# Patient Record
Sex: MALE | Race: WHITE | NOT HISPANIC OR LATINO | Employment: OTHER | URBAN - METROPOLITAN AREA
[De-identification: names, ages, dates, MRNs, and addresses within clinical notes are randomized per-mention and may not be internally consistent; named-entity substitution may affect disease eponyms.]

---

## 2021-11-27 ENCOUNTER — APPOINTMENT (OUTPATIENT)
Dept: CT IMAGING | Facility: HOSPITAL | Age: 81
End: 2021-11-27

## 2021-11-27 ENCOUNTER — APPOINTMENT (OUTPATIENT)
Dept: CARDIOLOGY | Facility: HOSPITAL | Age: 81
End: 2021-11-27

## 2021-11-27 ENCOUNTER — HOSPITAL ENCOUNTER (INPATIENT)
Facility: HOSPITAL | Age: 81
LOS: 6 days | Discharge: HOME HEALTH | End: 2021-12-03
Attending: EMERGENCY MEDICINE | Admitting: HOSPITALIST

## 2021-11-27 ENCOUNTER — APPOINTMENT (OUTPATIENT)
Dept: GENERAL RADIOLOGY | Facility: HOSPITAL | Age: 81
End: 2021-11-27

## 2021-11-27 DIAGNOSIS — R09.02 HYPOXIA: ICD-10-CM

## 2021-11-27 DIAGNOSIS — J96.01 ACUTE RESPIRATORY FAILURE WITH HYPOXIA (HCC): Primary | ICD-10-CM

## 2021-11-27 DIAGNOSIS — G62.9 PERIPHERAL POLYNEUROPATHY: ICD-10-CM

## 2021-11-27 DIAGNOSIS — J18.9 PNEUMONIA OF RIGHT UPPER LOBE DUE TO INFECTIOUS ORGANISM: ICD-10-CM

## 2021-11-27 PROBLEM — E87.6 HYPOKALEMIA: Status: ACTIVE | Noted: 2021-11-27

## 2021-11-27 PROBLEM — N40.0 BPH (BENIGN PROSTATIC HYPERPLASIA): Status: ACTIVE | Noted: 2021-11-27

## 2021-11-27 PROBLEM — A41.9 SEPSIS, UNSPECIFIED ORGANISM: Status: ACTIVE | Noted: 2021-11-27

## 2021-11-27 PROBLEM — E78.5 HYPERLIPIDEMIA: Status: ACTIVE | Noted: 2021-11-27

## 2021-11-27 PROBLEM — I10 ESSENTIAL HYPERTENSION: Status: ACTIVE | Noted: 2021-11-27

## 2021-11-27 LAB
ALBUMIN SERPL-MCNC: 3.9 G/DL (ref 3.5–5.2)
ALBUMIN/GLOB SERPL: 1.4 G/DL
ALP SERPL-CCNC: 52 U/L (ref 39–117)
ALT SERPL W P-5'-P-CCNC: 24 U/L (ref 1–41)
ANION GAP SERPL CALCULATED.3IONS-SCNC: 10 MMOL/L (ref 5–15)
AST SERPL-CCNC: 23 U/L (ref 1–40)
BASOPHILS # BLD AUTO: 0.07 10*3/MM3 (ref 0–0.2)
BASOPHILS NFR BLD AUTO: 0.6 % (ref 0–1.5)
BILIRUB SERPL-MCNC: 0.7 MG/DL (ref 0–1.2)
BUN SERPL-MCNC: 9 MG/DL (ref 8–23)
BUN/CREAT SERPL: 14.1 (ref 7–25)
CALCIUM SPEC-SCNC: 8.9 MG/DL (ref 8.6–10.5)
CHLORIDE SERPL-SCNC: 94 MMOL/L (ref 98–107)
CO2 SERPL-SCNC: 31 MMOL/L (ref 22–29)
CREAT SERPL-MCNC: 0.64 MG/DL (ref 0.76–1.27)
D-LACTATE SERPL-SCNC: 1.4 MMOL/L (ref 0.5–2)
D-LACTATE SERPL-SCNC: 2.5 MMOL/L (ref 0.5–2)
DEPRECATED RDW RBC AUTO: 42.7 FL (ref 37–54)
EOSINOPHIL # BLD AUTO: 0.18 10*3/MM3 (ref 0–0.4)
EOSINOPHIL NFR BLD AUTO: 1.5 % (ref 0.3–6.2)
ERYTHROCYTE [DISTWIDTH] IN BLOOD BY AUTOMATED COUNT: 13.5 % (ref 12.3–15.4)
FLUAV SUBTYP SPEC NAA+PROBE: NOT DETECTED
FLUBV RNA ISLT QL NAA+PROBE: NOT DETECTED
GFR SERPL CREATININE-BSD FRML MDRD: 120 ML/MIN/1.73
GLOBULIN UR ELPH-MCNC: 2.7 GM/DL
GLUCOSE SERPL-MCNC: 117 MG/DL (ref 65–99)
HCT VFR BLD AUTO: 41.4 % (ref 37.5–51)
HGB BLD-MCNC: 13.1 G/DL (ref 13–17.7)
HOLD SPECIMEN: NORMAL
IMM GRANULOCYTES # BLD AUTO: 0.1 10*3/MM3 (ref 0–0.05)
IMM GRANULOCYTES NFR BLD AUTO: 0.8 % (ref 0–0.5)
L PNEUMO1 AG UR QL IA: NEGATIVE
LYMPHOCYTES # BLD AUTO: 0.64 10*3/MM3 (ref 0.7–3.1)
LYMPHOCYTES NFR BLD AUTO: 5.2 % (ref 19.6–45.3)
MCH RBC QN AUTO: 27.4 PG (ref 26.6–33)
MCHC RBC AUTO-ENTMCNC: 31.6 G/DL (ref 31.5–35.7)
MCV RBC AUTO: 86.6 FL (ref 79–97)
MONOCYTES # BLD AUTO: 1.47 10*3/MM3 (ref 0.1–0.9)
MONOCYTES NFR BLD AUTO: 12 % (ref 5–12)
MRSA DNA SPEC QL NAA+PROBE: NEGATIVE
NEUTROPHILS NFR BLD AUTO: 79.9 % (ref 42.7–76)
NEUTROPHILS NFR BLD AUTO: 9.76 10*3/MM3 (ref 1.7–7)
NRBC BLD AUTO-RTO: 0 /100 WBC (ref 0–0.2)
NT-PROBNP SERPL-MCNC: 402.5 PG/ML (ref 0–1800)
PLATELET # BLD AUTO: 105 10*3/MM3 (ref 140–450)
PMV BLD AUTO: 10.4 FL (ref 6–12)
POTASSIUM SERPL-SCNC: 3.3 MMOL/L (ref 3.5–5.2)
PROCALCITONIN SERPL-MCNC: 0.07 NG/ML (ref 0–0.25)
PROT SERPL-MCNC: 6.6 G/DL (ref 6–8.5)
RBC # BLD AUTO: 4.78 10*6/MM3 (ref 4.14–5.8)
S PNEUM AG SPEC QL LA: NEGATIVE
SARS-COV-2 RNA PNL SPEC NAA+PROBE: NOT DETECTED
SODIUM SERPL-SCNC: 135 MMOL/L (ref 136–145)
TROPONIN T SERPL-MCNC: <0.01 NG/ML (ref 0–0.03)
WBC NRBC COR # BLD: 12.22 10*3/MM3 (ref 3.4–10.8)
WHOLE BLOOD HOLD SPECIMEN: NORMAL
WHOLE BLOOD HOLD SPECIMEN: NORMAL

## 2021-11-27 PROCEDURE — 71275 CT ANGIOGRAPHY CHEST: CPT

## 2021-11-27 PROCEDURE — 99223 1ST HOSP IP/OBS HIGH 75: CPT | Performed by: HOSPITALIST

## 2021-11-27 PROCEDURE — 87899 AGENT NOS ASSAY W/OPTIC: CPT | Performed by: HOSPITALIST

## 2021-11-27 PROCEDURE — 93005 ELECTROCARDIOGRAM TRACING: CPT

## 2021-11-27 PROCEDURE — 83605 ASSAY OF LACTIC ACID: CPT | Performed by: EMERGENCY MEDICINE

## 2021-11-27 PROCEDURE — 93005 ELECTROCARDIOGRAM TRACING: CPT | Performed by: EMERGENCY MEDICINE

## 2021-11-27 PROCEDURE — 84484 ASSAY OF TROPONIN QUANT: CPT | Performed by: EMERGENCY MEDICINE

## 2021-11-27 PROCEDURE — 93005 ELECTROCARDIOGRAM TRACING: CPT | Performed by: HOSPITALIST

## 2021-11-27 PROCEDURE — 87636 SARSCOV2 & INF A&B AMP PRB: CPT | Performed by: EMERGENCY MEDICINE

## 2021-11-27 PROCEDURE — 80053 COMPREHEN METABOLIC PANEL: CPT | Performed by: EMERGENCY MEDICINE

## 2021-11-27 PROCEDURE — 0 IOPAMIDOL PER 1 ML: Performed by: EMERGENCY MEDICINE

## 2021-11-27 PROCEDURE — 71045 X-RAY EXAM CHEST 1 VIEW: CPT

## 2021-11-27 PROCEDURE — 25010000002 PIPERACILLIN SOD-TAZOBACTAM PER 1 G: Performed by: EMERGENCY MEDICINE

## 2021-11-27 PROCEDURE — 87641 MR-STAPH DNA AMP PROBE: CPT | Performed by: HOSPITALIST

## 2021-11-27 PROCEDURE — 25010000002 VANCOMYCIN 10 G RECONSTITUTED SOLUTION: Performed by: EMERGENCY MEDICINE

## 2021-11-27 PROCEDURE — 84145 PROCALCITONIN (PCT): CPT | Performed by: EMERGENCY MEDICINE

## 2021-11-27 PROCEDURE — 25010000002 FUROSEMIDE PER 20 MG: Performed by: EMERGENCY MEDICINE

## 2021-11-27 PROCEDURE — 87040 BLOOD CULTURE FOR BACTERIA: CPT | Performed by: EMERGENCY MEDICINE

## 2021-11-27 PROCEDURE — 83880 ASSAY OF NATRIURETIC PEPTIDE: CPT | Performed by: EMERGENCY MEDICINE

## 2021-11-27 PROCEDURE — 93010 ELECTROCARDIOGRAM REPORT: CPT | Performed by: INTERNAL MEDICINE

## 2021-11-27 PROCEDURE — 25010000002 PIPERACILLIN SOD-TAZOBACTAM PER 1 G: Performed by: HOSPITALIST

## 2021-11-27 PROCEDURE — 25010000002 ENOXAPARIN PER 10 MG: Performed by: HOSPITALIST

## 2021-11-27 PROCEDURE — 93306 TTE W/DOPPLER COMPLETE: CPT

## 2021-11-27 PROCEDURE — 93306 TTE W/DOPPLER COMPLETE: CPT | Performed by: INTERNAL MEDICINE

## 2021-11-27 PROCEDURE — 63710000001 PREDNISONE PER 5 MG: Performed by: HOSPITALIST

## 2021-11-27 PROCEDURE — 99284 EMERGENCY DEPT VISIT MOD MDM: CPT

## 2021-11-27 PROCEDURE — 85025 COMPLETE CBC W/AUTO DIFF WBC: CPT | Performed by: EMERGENCY MEDICINE

## 2021-11-27 RX ORDER — AMOXICILLIN 250 MG
2 CAPSULE ORAL 2 TIMES DAILY
Status: DISCONTINUED | OUTPATIENT
Start: 2021-11-27 | End: 2021-11-30

## 2021-11-27 RX ORDER — ONDANSETRON 4 MG/1
4 TABLET, FILM COATED ORAL EVERY 6 HOURS PRN
Status: DISCONTINUED | OUTPATIENT
Start: 2021-11-27 | End: 2021-12-03 | Stop reason: HOSPADM

## 2021-11-27 RX ORDER — SODIUM CHLORIDE 0.9 % (FLUSH) 0.9 %
10 SYRINGE (ML) INJECTION AS NEEDED
Status: DISCONTINUED | OUTPATIENT
Start: 2021-11-27 | End: 2021-12-03 | Stop reason: HOSPADM

## 2021-11-27 RX ORDER — SODIUM CHLORIDE 0.9 % (FLUSH) 0.9 %
10 SYRINGE (ML) INJECTION EVERY 12 HOURS SCHEDULED
Status: DISCONTINUED | OUTPATIENT
Start: 2021-11-27 | End: 2021-12-03 | Stop reason: HOSPADM

## 2021-11-27 RX ORDER — ACETAMINOPHEN 325 MG/1
650 TABLET ORAL EVERY 4 HOURS PRN
Status: DISCONTINUED | OUTPATIENT
Start: 2021-11-27 | End: 2021-12-03 | Stop reason: HOSPADM

## 2021-11-27 RX ORDER — ATORVASTATIN CALCIUM 10 MG/1
10 TABLET, FILM COATED ORAL DAILY
COMMUNITY

## 2021-11-27 RX ORDER — ALBUTEROL SULFATE 2.5 MG/3ML
2.5 SOLUTION RESPIRATORY (INHALATION)
Status: DISCONTINUED | OUTPATIENT
Start: 2021-11-27 | End: 2021-12-03 | Stop reason: HOSPADM

## 2021-11-27 RX ORDER — ESCITALOPRAM OXALATE 20 MG/1
20 TABLET ORAL DAILY
COMMUNITY

## 2021-11-27 RX ORDER — POTASSIUM CHLORIDE 1.5 G/1.77G
40 POWDER, FOR SOLUTION ORAL AS NEEDED
Status: DISCONTINUED | OUTPATIENT
Start: 2021-11-27 | End: 2021-12-03 | Stop reason: HOSPADM

## 2021-11-27 RX ORDER — PREDNISONE 10 MG/1
10 TABLET ORAL DAILY
COMMUNITY
End: 2021-12-03 | Stop reason: HOSPADM

## 2021-11-27 RX ORDER — POTASSIUM CHLORIDE 7.45 MG/ML
10 INJECTION INTRAVENOUS
Status: DISCONTINUED | OUTPATIENT
Start: 2021-11-27 | End: 2021-12-03 | Stop reason: HOSPADM

## 2021-11-27 RX ORDER — TAMSULOSIN HYDROCHLORIDE 0.4 MG/1
1 CAPSULE ORAL DAILY
COMMUNITY

## 2021-11-27 RX ORDER — FUROSEMIDE 10 MG/ML
40 INJECTION INTRAMUSCULAR; INTRAVENOUS ONCE
Status: COMPLETED | OUTPATIENT
Start: 2021-11-27 | End: 2021-11-27

## 2021-11-27 RX ORDER — POLYETHYLENE GLYCOL 3350 17 G/17G
17 POWDER, FOR SOLUTION ORAL DAILY PRN
Status: DISCONTINUED | OUTPATIENT
Start: 2021-11-27 | End: 2021-12-03 | Stop reason: HOSPADM

## 2021-11-27 RX ORDER — ATORVASTATIN CALCIUM 10 MG/1
10 TABLET, FILM COATED ORAL DAILY
Status: DISCONTINUED | OUTPATIENT
Start: 2021-11-27 | End: 2021-12-03 | Stop reason: HOSPADM

## 2021-11-27 RX ORDER — GABAPENTIN 100 MG/1
100 CAPSULE ORAL EVERY 12 HOURS SCHEDULED
Status: DISCONTINUED | OUTPATIENT
Start: 2021-11-27 | End: 2021-11-29

## 2021-11-27 RX ORDER — ATENOLOL AND CHLORTHALIDONE 100; 25 MG/1; MG/1
TABLET ORAL
Status: DISCONTINUED | OUTPATIENT
Start: 2021-11-28 | End: 2021-12-01

## 2021-11-27 RX ORDER — ESCITALOPRAM OXALATE 20 MG/1
20 TABLET ORAL DAILY
Status: DISCONTINUED | OUTPATIENT
Start: 2021-11-27 | End: 2021-12-03 | Stop reason: HOSPADM

## 2021-11-27 RX ORDER — BISACODYL 10 MG
10 SUPPOSITORY, RECTAL RECTAL DAILY PRN
Status: DISCONTINUED | OUTPATIENT
Start: 2021-11-27 | End: 2021-12-03 | Stop reason: HOSPADM

## 2021-11-27 RX ORDER — ALBUTEROL SULFATE 2.5 MG/3ML
2.5 SOLUTION RESPIRATORY (INHALATION) EVERY 6 HOURS PRN
Status: DISCONTINUED | OUTPATIENT
Start: 2021-11-27 | End: 2021-12-03 | Stop reason: HOSPADM

## 2021-11-27 RX ORDER — POTASSIUM CHLORIDE 750 MG/1
40 CAPSULE, EXTENDED RELEASE ORAL AS NEEDED
Status: DISCONTINUED | OUTPATIENT
Start: 2021-11-27 | End: 2021-12-03 | Stop reason: HOSPADM

## 2021-11-27 RX ORDER — ATENOLOL AND CHLORTHALIDONE TABLET 100; 25 MG/1; MG/1
1 TABLET ORAL DAILY
COMMUNITY
End: 2021-12-03 | Stop reason: HOSPADM

## 2021-11-27 RX ORDER — GABAPENTIN 300 MG/1
300 CAPSULE ORAL 2 TIMES DAILY
Status: ON HOLD | COMMUNITY
End: 2021-12-03 | Stop reason: SDUPTHER

## 2021-11-27 RX ORDER — CHOLECALCIFEROL (VITAMIN D3) 125 MCG
5 CAPSULE ORAL NIGHTLY PRN
Status: DISCONTINUED | OUTPATIENT
Start: 2021-11-27 | End: 2021-12-03 | Stop reason: HOSPADM

## 2021-11-27 RX ORDER — PREDNISONE 10 MG/1
10 TABLET ORAL DAILY
Status: DISCONTINUED | OUTPATIENT
Start: 2021-11-27 | End: 2021-11-29

## 2021-11-27 RX ORDER — BISACODYL 5 MG/1
5 TABLET, DELAYED RELEASE ORAL DAILY PRN
Status: DISCONTINUED | OUTPATIENT
Start: 2021-11-27 | End: 2021-12-03 | Stop reason: HOSPADM

## 2021-11-27 RX ORDER — FINASTERIDE 5 MG/1
5 TABLET, FILM COATED ORAL DAILY
COMMUNITY

## 2021-11-27 RX ORDER — FINASTERIDE 5 MG/1
5 TABLET, FILM COATED ORAL DAILY
Status: DISCONTINUED | OUTPATIENT
Start: 2021-11-27 | End: 2021-12-03 | Stop reason: HOSPADM

## 2021-11-27 RX ORDER — TAMSULOSIN HYDROCHLORIDE 0.4 MG/1
0.4 CAPSULE ORAL DAILY
Status: DISCONTINUED | OUTPATIENT
Start: 2021-11-27 | End: 2021-11-29

## 2021-11-27 RX ADMIN — IOPAMIDOL 100 ML: 755 INJECTION, SOLUTION INTRAVENOUS at 10:15

## 2021-11-27 RX ADMIN — TAZOBACTAM SODIUM AND PIPERACILLIN SODIUM 4.5 G: 500; 4 INJECTION, SOLUTION INTRAVENOUS at 15:49

## 2021-11-27 RX ADMIN — GABAPENTIN 100 MG: 100 CAPSULE ORAL at 21:17

## 2021-11-27 RX ADMIN — FINASTERIDE 5 MG: 5 TABLET, FILM COATED ORAL at 15:55

## 2021-11-27 RX ADMIN — POTASSIUM CHLORIDE 40 MEQ: 750 CAPSULE, EXTENDED RELEASE ORAL at 15:54

## 2021-11-27 RX ADMIN — FUROSEMIDE 40 MG: 40 INJECTION, SOLUTION INTRAMUSCULAR; INTRAVENOUS at 13:16

## 2021-11-27 RX ADMIN — VANCOMYCIN HYDROCHLORIDE 2000 MG: 10 INJECTION, POWDER, LYOPHILIZED, FOR SOLUTION INTRAVENOUS at 13:16

## 2021-11-27 RX ADMIN — SENNOSIDES AND DOCUSATE SODIUM 2 TABLET: 50; 8.6 TABLET ORAL at 21:17

## 2021-11-27 RX ADMIN — SODIUM CHLORIDE, PRESERVATIVE FREE 10 ML: 5 INJECTION INTRAVENOUS at 21:18

## 2021-11-27 RX ADMIN — TAZOBACTAM SODIUM AND PIPERACILLIN SODIUM 3.38 G: 375; 3 INJECTION, SOLUTION INTRAVENOUS at 09:55

## 2021-11-27 RX ADMIN — PREDNISONE 10 MG: 10 TABLET ORAL at 15:54

## 2021-11-27 RX ADMIN — DOXYCYCLINE 100 MG: 100 INJECTION, POWDER, LYOPHILIZED, FOR SOLUTION INTRAVENOUS at 21:17

## 2021-11-27 RX ADMIN — TAMSULOSIN HYDROCHLORIDE 0.4 MG: 0.4 CAPSULE ORAL at 17:45

## 2021-11-27 RX ADMIN — POTASSIUM CHLORIDE 40 MEQ: 750 CAPSULE, EXTENDED RELEASE ORAL at 21:17

## 2021-11-27 RX ADMIN — ESCITALOPRAM OXALATE 20 MG: 20 TABLET ORAL at 15:54

## 2021-11-27 RX ADMIN — ENOXAPARIN SODIUM 40 MG: 40 INJECTION SUBCUTANEOUS at 15:55

## 2021-11-27 RX ADMIN — ATORVASTATIN CALCIUM 10 MG: 10 TABLET, FILM COATED ORAL at 15:55

## 2021-11-28 LAB
ANION GAP SERPL CALCULATED.3IONS-SCNC: 8 MMOL/L (ref 5–15)
BH CV ECHO MEAS - AO MAX PG (FULL): 2.9 MMHG
BH CV ECHO MEAS - AO MAX PG: 9 MMHG
BH CV ECHO MEAS - AO MEAN PG (FULL): 1.5 MMHG
BH CV ECHO MEAS - AO MEAN PG: 4.7 MMHG
BH CV ECHO MEAS - AO ROOT AREA (BSA CORRECTED): 1.4
BH CV ECHO MEAS - AO ROOT AREA: 7.1 CM^2
BH CV ECHO MEAS - AO ROOT DIAM: 3 CM
BH CV ECHO MEAS - AO V2 MAX: 149.6 CM/SEC
BH CV ECHO MEAS - AO V2 MEAN: 102.5 CM/SEC
BH CV ECHO MEAS - AO V2 VTI: 28.1 CM
BH CV ECHO MEAS - AVA(I,A): 2.7 CM^2
BH CV ECHO MEAS - AVA(I,D): 2.7 CM^2
BH CV ECHO MEAS - AVA(V,A): 2.5 CM^2
BH CV ECHO MEAS - AVA(V,D): 2.5 CM^2
BH CV ECHO MEAS - BSA(HAYCOCK): 2.3 M^2
BH CV ECHO MEAS - BSA: 2.2 M^2
BH CV ECHO MEAS - BZI_BMI: 33 KILOGRAMS/M^2
BH CV ECHO MEAS - BZI_METRIC_HEIGHT: 177.8 CM
BH CV ECHO MEAS - BZI_METRIC_WEIGHT: 104.3 KG
BH CV ECHO MEAS - EDV(CUBED): 67 ML
BH CV ECHO MEAS - EDV(TEICH): 72.6 ML
BH CV ECHO MEAS - EF(CUBED): 58.6 %
BH CV ECHO MEAS - EF(TEICH): 50.7 %
BH CV ECHO MEAS - ESV(CUBED): 27.8 ML
BH CV ECHO MEAS - ESV(TEICH): 35.8 ML
BH CV ECHO MEAS - FS: 25.5 %
BH CV ECHO MEAS - IVS/LVPW: 1.1
BH CV ECHO MEAS - IVSD: 1.1 CM
BH CV ECHO MEAS - LA DIMENSION: 3.7 CM
BH CV ECHO MEAS - LA/AO: 1.2
BH CV ECHO MEAS - LAT PEAK E' VEL: 9.8 CM/SEC
BH CV ECHO MEAS - LATERAL E/E' RATIO: 8.8
BH CV ECHO MEAS - LV IVRT: 0.07 SEC
BH CV ECHO MEAS - LV MASS(C)D: 141.3 GRAMS
BH CV ECHO MEAS - LV MASS(C)DI: 63.8 GRAMS/M^2
BH CV ECHO MEAS - LV MAX PG: 6.1 MMHG
BH CV ECHO MEAS - LV MEAN PG: 3.3 MMHG
BH CV ECHO MEAS - LV V1 MAX: 123.6 CM/SEC
BH CV ECHO MEAS - LV V1 MEAN: 84.1 CM/SEC
BH CV ECHO MEAS - LV V1 VTI: 25.2 CM
BH CV ECHO MEAS - LVIDD: 4.1 CM
BH CV ECHO MEAS - LVIDS: 3 CM
BH CV ECHO MEAS - LVOT AREA (M): 3.1 CM^2
BH CV ECHO MEAS - LVOT AREA: 3.1 CM^2
BH CV ECHO MEAS - LVOT DIAM: 2 CM
BH CV ECHO MEAS - LVPWD: 1 CM
BH CV ECHO MEAS - MED PEAK E' VEL: 5.9 CM/SEC
BH CV ECHO MEAS - MEDIAL E/E' RATIO: 14.7
BH CV ECHO MEAS - MV A MAX VEL: 127.9 CM/SEC
BH CV ECHO MEAS - MV DEC SLOPE: 360.7 CM/SEC^2
BH CV ECHO MEAS - MV DEC TIME: 0.21 SEC
BH CV ECHO MEAS - MV E MAX VEL: 86.1 CM/SEC
BH CV ECHO MEAS - MV E/A: 0.67
BH CV ECHO MEAS - MV MAX PG: 9.8 MMHG
BH CV ECHO MEAS - MV MEAN PG: 3.2 MMHG
BH CV ECHO MEAS - MV P1/2T MAX VEL: 97.2 CM/SEC
BH CV ECHO MEAS - MV P1/2T: 78.9 MSEC
BH CV ECHO MEAS - MV V2 MAX: 156.2 CM/SEC
BH CV ECHO MEAS - MV V2 MEAN: 81.7 CM/SEC
BH CV ECHO MEAS - MV V2 VTI: 26.1 CM
BH CV ECHO MEAS - MVA P1/2T LCG: 2.3 CM^2
BH CV ECHO MEAS - MVA(P1/2T): 2.8 CM^2
BH CV ECHO MEAS - MVA(VTI): 2.9 CM^2
BH CV ECHO MEAS - PA ACC TIME: 0.08 SEC
BH CV ECHO MEAS - PA PR(ACCEL): 42.2 MMHG
BH CV ECHO MEAS - RAP SYSTOLE: 3 MMHG
BH CV ECHO MEAS - RVSP: 28 MMHG
BH CV ECHO MEAS - SI(AO): 90.8 ML/M^2
BH CV ECHO MEAS - SI(CUBED): 17.7 ML/M^2
BH CV ECHO MEAS - SI(LVOT): 34.7 ML/M^2
BH CV ECHO MEAS - SI(TEICH): 16.6 ML/M^2
BH CV ECHO MEAS - SV(AO): 201.2 ML
BH CV ECHO MEAS - SV(CUBED): 39.3 ML
BH CV ECHO MEAS - SV(LVOT): 76.9 ML
BH CV ECHO MEAS - SV(TEICH): 36.8 ML
BH CV ECHO MEAS - TAPSE (>1.6): 1.9 CM
BH CV ECHO MEAS - TR MAX PG: 25 MMHG
BH CV ECHO MEAS - TR MAX VEL: 316.2 CM/SEC
BH CV ECHO MEASUREMENTS AVERAGE E/E' RATIO: 10.97
BH CV XLRA - RV BASE: 3.8 CM
BH CV XLRA - RV LENGTH: 7.5 CM
BH CV XLRA - RV MID: 3.3 CM
BH CV XLRA - TDI S': 13.2 CM/SEC
BUN SERPL-MCNC: 11 MG/DL (ref 8–23)
BUN/CREAT SERPL: 16.9 (ref 7–25)
CALCIUM SPEC-SCNC: 8.9 MG/DL (ref 8.6–10.5)
CHLORIDE SERPL-SCNC: 96 MMOL/L (ref 98–107)
CO2 SERPL-SCNC: 30 MMOL/L (ref 22–29)
CREAT SERPL-MCNC: 0.65 MG/DL (ref 0.76–1.27)
DEPRECATED RDW RBC AUTO: 42 FL (ref 37–54)
ERYTHROCYTE [DISTWIDTH] IN BLOOD BY AUTOMATED COUNT: 13.4 % (ref 12.3–15.4)
GFR SERPL CREATININE-BSD FRML MDRD: 118 ML/MIN/1.73
GLUCOSE SERPL-MCNC: 98 MG/DL (ref 65–99)
HCT VFR BLD AUTO: 38 % (ref 37.5–51)
HGB BLD-MCNC: 12.3 G/DL (ref 13–17.7)
LV EF 2D ECHO EST: 55 %
MAXIMAL PREDICTED HEART RATE: 139 BPM
MCH RBC QN AUTO: 27.6 PG (ref 26.6–33)
MCHC RBC AUTO-ENTMCNC: 32.4 G/DL (ref 31.5–35.7)
MCV RBC AUTO: 85.2 FL (ref 79–97)
PLATELET # BLD AUTO: 113 10*3/MM3 (ref 140–450)
PMV BLD AUTO: 10.6 FL (ref 6–12)
POTASSIUM SERPL-SCNC: 3.5 MMOL/L (ref 3.5–5.2)
QT INTERVAL: 326 MS
QTC INTERVAL: 430 MS
RBC # BLD AUTO: 4.46 10*6/MM3 (ref 4.14–5.8)
SODIUM SERPL-SCNC: 134 MMOL/L (ref 136–145)
STRESS TARGET HR: 118 BPM
WBC NRBC COR # BLD: 11.48 10*3/MM3 (ref 3.4–10.8)

## 2021-11-28 PROCEDURE — 94799 UNLISTED PULMONARY SVC/PX: CPT

## 2021-11-28 PROCEDURE — 25010000002 PIPERACILLIN SOD-TAZOBACTAM PER 1 G: Performed by: HOSPITALIST

## 2021-11-28 PROCEDURE — 94640 AIRWAY INHALATION TREATMENT: CPT

## 2021-11-28 PROCEDURE — 63710000001 PREDNISONE PER 5 MG: Performed by: HOSPITALIST

## 2021-11-28 PROCEDURE — 80048 BASIC METABOLIC PNL TOTAL CA: CPT | Performed by: HOSPITALIST

## 2021-11-28 PROCEDURE — 25010000002 ENOXAPARIN PER 10 MG: Performed by: HOSPITALIST

## 2021-11-28 PROCEDURE — 99233 SBSQ HOSP IP/OBS HIGH 50: CPT | Performed by: HOSPITALIST

## 2021-11-28 PROCEDURE — 85027 COMPLETE CBC AUTOMATED: CPT | Performed by: HOSPITALIST

## 2021-11-28 PROCEDURE — 97162 PT EVAL MOD COMPLEX 30 MIN: CPT

## 2021-11-28 RX ADMIN — ALBUTEROL SULFATE 2.5 MG: 2.5 SOLUTION RESPIRATORY (INHALATION) at 12:03

## 2021-11-28 RX ADMIN — TAZOBACTAM SODIUM AND PIPERACILLIN SODIUM 4.5 G: 500; 4 INJECTION, SOLUTION INTRAVENOUS at 01:39

## 2021-11-28 RX ADMIN — ALBUTEROL SULFATE 2.5 MG: 2.5 SOLUTION RESPIRATORY (INHALATION) at 07:26

## 2021-11-28 RX ADMIN — FINASTERIDE 5 MG: 5 TABLET, FILM COATED ORAL at 08:04

## 2021-11-28 RX ADMIN — ATENOLOL: 50 TABLET ORAL at 08:09

## 2021-11-28 RX ADMIN — DOXYCYCLINE 100 MG: 100 INJECTION, POWDER, LYOPHILIZED, FOR SOLUTION INTRAVENOUS at 20:13

## 2021-11-28 RX ADMIN — GABAPENTIN 100 MG: 100 CAPSULE ORAL at 20:11

## 2021-11-28 RX ADMIN — SENNOSIDES AND DOCUSATE SODIUM 2 TABLET: 50; 8.6 TABLET ORAL at 20:10

## 2021-11-28 RX ADMIN — TAZOBACTAM SODIUM AND PIPERACILLIN SODIUM 4.5 G: 500; 4 INJECTION, SOLUTION INTRAVENOUS at 23:50

## 2021-11-28 RX ADMIN — PREDNISONE 10 MG: 10 TABLET ORAL at 08:04

## 2021-11-28 RX ADMIN — ALBUTEROL SULFATE 2.5 MG: 2.5 SOLUTION RESPIRATORY (INHALATION) at 20:15

## 2021-11-28 RX ADMIN — ALBUTEROL SULFATE 2.5 MG: 2.5 SOLUTION RESPIRATORY (INHALATION) at 01:52

## 2021-11-28 RX ADMIN — TAMSULOSIN HYDROCHLORIDE 0.4 MG: 0.4 CAPSULE ORAL at 17:15

## 2021-11-28 RX ADMIN — DOXYCYCLINE 100 MG: 100 INJECTION, POWDER, LYOPHILIZED, FOR SOLUTION INTRAVENOUS at 08:12

## 2021-11-28 RX ADMIN — ATORVASTATIN CALCIUM 10 MG: 10 TABLET, FILM COATED ORAL at 08:05

## 2021-11-28 RX ADMIN — GABAPENTIN 100 MG: 100 CAPSULE ORAL at 08:05

## 2021-11-28 RX ADMIN — SODIUM CHLORIDE, PRESERVATIVE FREE 10 ML: 5 INJECTION INTRAVENOUS at 08:12

## 2021-11-28 RX ADMIN — TAZOBACTAM SODIUM AND PIPERACILLIN SODIUM 4.5 G: 500; 4 INJECTION, SOLUTION INTRAVENOUS at 15:57

## 2021-11-28 RX ADMIN — SODIUM CHLORIDE, PRESERVATIVE FREE 10 ML: 5 INJECTION INTRAVENOUS at 20:11

## 2021-11-28 RX ADMIN — ENOXAPARIN SODIUM 40 MG: 40 INJECTION SUBCUTANEOUS at 08:04

## 2021-11-28 RX ADMIN — POTASSIUM CHLORIDE 40 MEQ: 750 CAPSULE, EXTENDED RELEASE ORAL at 20:10

## 2021-11-28 RX ADMIN — POTASSIUM CHLORIDE 40 MEQ: 750 CAPSULE, EXTENDED RELEASE ORAL at 10:20

## 2021-11-28 RX ADMIN — ESCITALOPRAM OXALATE 20 MG: 20 TABLET ORAL at 08:05

## 2021-11-29 ENCOUNTER — APPOINTMENT (OUTPATIENT)
Dept: GENERAL RADIOLOGY | Facility: HOSPITAL | Age: 81
End: 2021-11-29

## 2021-11-29 LAB
ANION GAP SERPL CALCULATED.3IONS-SCNC: 11 MMOL/L (ref 5–15)
BUN SERPL-MCNC: 12 MG/DL (ref 8–23)
BUN/CREAT SERPL: 18.5 (ref 7–25)
CALCIUM SPEC-SCNC: 8.8 MG/DL (ref 8.6–10.5)
CHLORIDE SERPL-SCNC: 96 MMOL/L (ref 98–107)
CO2 SERPL-SCNC: 25 MMOL/L (ref 22–29)
CREAT SERPL-MCNC: 0.65 MG/DL (ref 0.76–1.27)
DEPRECATED RDW RBC AUTO: 41 FL (ref 37–54)
ERYTHROCYTE [DISTWIDTH] IN BLOOD BY AUTOMATED COUNT: 13.4 % (ref 12.3–15.4)
GFR SERPL CREATININE-BSD FRML MDRD: 118 ML/MIN/1.73
GLUCOSE SERPL-MCNC: 118 MG/DL (ref 65–99)
HCT VFR BLD AUTO: 37.5 % (ref 37.5–51)
HGB BLD-MCNC: 12.2 G/DL (ref 13–17.7)
MCH RBC QN AUTO: 27.4 PG (ref 26.6–33)
MCHC RBC AUTO-ENTMCNC: 32.5 G/DL (ref 31.5–35.7)
MCV RBC AUTO: 84.3 FL (ref 79–97)
NT-PROBNP SERPL-MCNC: 100.7 PG/ML (ref 0–1800)
PLATELET # BLD AUTO: 138 10*3/MM3 (ref 140–450)
PMV BLD AUTO: 10.5 FL (ref 6–12)
POTASSIUM SERPL-SCNC: 3.8 MMOL/L (ref 3.5–5.2)
POTASSIUM SERPL-SCNC: 3.8 MMOL/L (ref 3.5–5.2)
QT INTERVAL: 368 MS
QTC INTERVAL: 474 MS
RBC # BLD AUTO: 4.45 10*6/MM3 (ref 4.14–5.8)
SODIUM SERPL-SCNC: 132 MMOL/L (ref 136–145)
WBC NRBC COR # BLD: 11.63 10*3/MM3 (ref 3.4–10.8)

## 2021-11-29 PROCEDURE — 80048 BASIC METABOLIC PNL TOTAL CA: CPT | Performed by: HOSPITALIST

## 2021-11-29 PROCEDURE — 99233 SBSQ HOSP IP/OBS HIGH 50: CPT | Performed by: HOSPITALIST

## 2021-11-29 PROCEDURE — 25010000002 ENOXAPARIN PER 10 MG: Performed by: HOSPITALIST

## 2021-11-29 PROCEDURE — 94799 UNLISTED PULMONARY SVC/PX: CPT

## 2021-11-29 PROCEDURE — 71045 X-RAY EXAM CHEST 1 VIEW: CPT

## 2021-11-29 PROCEDURE — 97165 OT EVAL LOW COMPLEX 30 MIN: CPT

## 2021-11-29 PROCEDURE — 83880 ASSAY OF NATRIURETIC PEPTIDE: CPT | Performed by: HOSPITALIST

## 2021-11-29 PROCEDURE — 85027 COMPLETE CBC AUTOMATED: CPT | Performed by: HOSPITALIST

## 2021-11-29 PROCEDURE — 84132 ASSAY OF SERUM POTASSIUM: CPT | Performed by: HOSPITALIST

## 2021-11-29 PROCEDURE — 25010000002 PIPERACILLIN SOD-TAZOBACTAM PER 1 G: Performed by: HOSPITALIST

## 2021-11-29 PROCEDURE — 63710000001 PREDNISONE PER 5 MG: Performed by: HOSPITALIST

## 2021-11-29 PROCEDURE — 97110 THERAPEUTIC EXERCISES: CPT

## 2021-11-29 RX ORDER — TAMSULOSIN HYDROCHLORIDE 0.4 MG/1
0.4 CAPSULE ORAL EVERY 24 HOURS
Status: DISCONTINUED | OUTPATIENT
Start: 2021-11-29 | End: 2021-12-03 | Stop reason: HOSPADM

## 2021-11-29 RX ORDER — PREDNISONE 1 MG/1
5 TABLET ORAL DAILY
Status: DISCONTINUED | OUTPATIENT
Start: 2021-11-30 | End: 2021-12-01

## 2021-11-29 RX ORDER — DOXYCYCLINE 100 MG/1
100 CAPSULE ORAL EVERY 12 HOURS SCHEDULED
Status: DISCONTINUED | OUTPATIENT
Start: 2021-11-29 | End: 2021-12-03 | Stop reason: HOSPADM

## 2021-11-29 RX ORDER — GABAPENTIN 300 MG/1
300 CAPSULE ORAL DAILY
Status: DISCONTINUED | OUTPATIENT
Start: 2021-11-30 | End: 2021-11-30

## 2021-11-29 RX ADMIN — ALBUTEROL SULFATE 2.5 MG: 2.5 SOLUTION RESPIRATORY (INHALATION) at 02:34

## 2021-11-29 RX ADMIN — ALBUTEROL SULFATE 2.5 MG: 2.5 SOLUTION RESPIRATORY (INHALATION) at 19:18

## 2021-11-29 RX ADMIN — ALBUTEROL SULFATE 2.5 MG: 2.5 SOLUTION RESPIRATORY (INHALATION) at 07:08

## 2021-11-29 RX ADMIN — DOXYCYCLINE 100 MG: 100 INJECTION, POWDER, LYOPHILIZED, FOR SOLUTION INTRAVENOUS at 08:01

## 2021-11-29 RX ADMIN — TAZOBACTAM SODIUM AND PIPERACILLIN SODIUM 4.5 G: 500; 4 INJECTION, SOLUTION INTRAVENOUS at 16:16

## 2021-11-29 RX ADMIN — Medication 5 MG: at 02:22

## 2021-11-29 RX ADMIN — SENNOSIDES AND DOCUSATE SODIUM 2 TABLET: 50; 8.6 TABLET ORAL at 20:55

## 2021-11-29 RX ADMIN — SODIUM CHLORIDE, PRESERVATIVE FREE 10 ML: 5 INJECTION INTRAVENOUS at 08:01

## 2021-11-29 RX ADMIN — TAMSULOSIN HYDROCHLORIDE 0.4 MG: 0.4 CAPSULE ORAL at 18:07

## 2021-11-29 RX ADMIN — PREDNISONE 10 MG: 10 TABLET ORAL at 08:01

## 2021-11-29 RX ADMIN — DOXYCYCLINE 100 MG: 100 CAPSULE ORAL at 20:55

## 2021-11-29 RX ADMIN — ESCITALOPRAM OXALATE 20 MG: 20 TABLET ORAL at 08:01

## 2021-11-29 RX ADMIN — ALBUTEROL SULFATE 2.5 MG: 2.5 SOLUTION RESPIRATORY (INHALATION) at 23:55

## 2021-11-29 RX ADMIN — ALBUTEROL SULFATE 2.5 MG: 2.5 SOLUTION RESPIRATORY (INHALATION) at 13:22

## 2021-11-29 RX ADMIN — ENOXAPARIN SODIUM 40 MG: 40 INJECTION SUBCUTANEOUS at 08:01

## 2021-11-29 RX ADMIN — ATORVASTATIN CALCIUM 10 MG: 10 TABLET, FILM COATED ORAL at 08:01

## 2021-11-29 RX ADMIN — SODIUM CHLORIDE, PRESERVATIVE FREE 10 ML: 5 INJECTION INTRAVENOUS at 20:56

## 2021-11-29 RX ADMIN — TAZOBACTAM SODIUM AND PIPERACILLIN SODIUM 4.5 G: 500; 4 INJECTION, SOLUTION INTRAVENOUS at 23:28

## 2021-11-29 RX ADMIN — FINASTERIDE 5 MG: 5 TABLET, FILM COATED ORAL at 08:01

## 2021-11-29 RX ADMIN — TAZOBACTAM SODIUM AND PIPERACILLIN SODIUM 4.5 G: 500; 4 INJECTION, SOLUTION INTRAVENOUS at 10:48

## 2021-11-29 RX ADMIN — ATENOLOL: 50 TABLET ORAL at 08:10

## 2021-11-29 RX ADMIN — GABAPENTIN 100 MG: 100 CAPSULE ORAL at 08:01

## 2021-11-30 LAB
ANION GAP SERPL CALCULATED.3IONS-SCNC: 11 MMOL/L (ref 5–15)
BUN SERPL-MCNC: 9 MG/DL (ref 8–23)
BUN/CREAT SERPL: 15.3 (ref 7–25)
CALCIUM SPEC-SCNC: 8.8 MG/DL (ref 8.6–10.5)
CHLORIDE SERPL-SCNC: 95 MMOL/L (ref 98–107)
CO2 SERPL-SCNC: 25 MMOL/L (ref 22–29)
CREAT SERPL-MCNC: 0.59 MG/DL (ref 0.76–1.27)
DEPRECATED RDW RBC AUTO: 43.9 FL (ref 37–54)
ERYTHROCYTE [DISTWIDTH] IN BLOOD BY AUTOMATED COUNT: 13.6 % (ref 12.3–15.4)
GFR SERPL CREATININE-BSD FRML MDRD: 132 ML/MIN/1.73
GLUCOSE SERPL-MCNC: 84 MG/DL (ref 65–99)
HCT VFR BLD AUTO: 37.7 % (ref 37.5–51)
HGB BLD-MCNC: 11.7 G/DL (ref 13–17.7)
MCH RBC QN AUTO: 27.4 PG (ref 26.6–33)
MCHC RBC AUTO-ENTMCNC: 31 G/DL (ref 31.5–35.7)
MCV RBC AUTO: 88.3 FL (ref 79–97)
PLATELET # BLD AUTO: 120 10*3/MM3 (ref 140–450)
PMV BLD AUTO: 10.6 FL (ref 6–12)
POTASSIUM SERPL-SCNC: 3.3 MMOL/L (ref 3.5–5.2)
POTASSIUM SERPL-SCNC: 4 MMOL/L (ref 3.5–5.2)
RBC # BLD AUTO: 4.27 10*6/MM3 (ref 4.14–5.8)
SODIUM SERPL-SCNC: 131 MMOL/L (ref 136–145)
WBC NRBC COR # BLD: 8.92 10*3/MM3 (ref 3.4–10.8)

## 2021-11-30 PROCEDURE — 85027 COMPLETE CBC AUTOMATED: CPT | Performed by: HOSPITALIST

## 2021-11-30 PROCEDURE — 25010000002 ENOXAPARIN PER 10 MG: Performed by: HOSPITALIST

## 2021-11-30 PROCEDURE — 99233 SBSQ HOSP IP/OBS HIGH 50: CPT | Performed by: HOSPITALIST

## 2021-11-30 PROCEDURE — 94799 UNLISTED PULMONARY SVC/PX: CPT

## 2021-11-30 PROCEDURE — 63710000001 PREDNISONE PER 5 MG: Performed by: HOSPITALIST

## 2021-11-30 PROCEDURE — 97530 THERAPEUTIC ACTIVITIES: CPT

## 2021-11-30 PROCEDURE — 25010000002 PIPERACILLIN SOD-TAZOBACTAM PER 1 G: Performed by: HOSPITALIST

## 2021-11-30 PROCEDURE — 84132 ASSAY OF SERUM POTASSIUM: CPT | Performed by: HOSPITALIST

## 2021-11-30 PROCEDURE — 87205 SMEAR GRAM STAIN: CPT | Performed by: HOSPITALIST

## 2021-11-30 PROCEDURE — 80048 BASIC METABOLIC PNL TOTAL CA: CPT | Performed by: HOSPITALIST

## 2021-11-30 PROCEDURE — 99222 1ST HOSP IP/OBS MODERATE 55: CPT | Performed by: INTERNAL MEDICINE

## 2021-11-30 PROCEDURE — 87070 CULTURE OTHR SPECIMN AEROBIC: CPT | Performed by: HOSPITALIST

## 2021-11-30 PROCEDURE — 97110 THERAPEUTIC EXERCISES: CPT

## 2021-11-30 PROCEDURE — 97535 SELF CARE MNGMENT TRAINING: CPT

## 2021-11-30 RX ORDER — GABAPENTIN 300 MG/1
300 CAPSULE ORAL NIGHTLY
Status: DISCONTINUED | OUTPATIENT
Start: 2021-11-30 | End: 2021-12-03 | Stop reason: HOSPADM

## 2021-11-30 RX ADMIN — TAZOBACTAM SODIUM AND PIPERACILLIN SODIUM 4.5 G: 500; 4 INJECTION, SOLUTION INTRAVENOUS at 17:27

## 2021-11-30 RX ADMIN — POTASSIUM CHLORIDE 40 MEQ: 750 CAPSULE, EXTENDED RELEASE ORAL at 09:33

## 2021-11-30 RX ADMIN — ALBUTEROL SULFATE 2.5 MG: 2.5 SOLUTION RESPIRATORY (INHALATION) at 12:38

## 2021-11-30 RX ADMIN — TAMSULOSIN HYDROCHLORIDE 0.4 MG: 0.4 CAPSULE ORAL at 17:27

## 2021-11-30 RX ADMIN — ALBUTEROL SULFATE 2.5 MG: 2.5 SOLUTION RESPIRATORY (INHALATION) at 20:06

## 2021-11-30 RX ADMIN — ESCITALOPRAM OXALATE 20 MG: 20 TABLET ORAL at 08:37

## 2021-11-30 RX ADMIN — ATORVASTATIN CALCIUM 10 MG: 10 TABLET, FILM COATED ORAL at 08:37

## 2021-11-30 RX ADMIN — DOXYCYCLINE 100 MG: 100 CAPSULE ORAL at 08:37

## 2021-11-30 RX ADMIN — GABAPENTIN 300 MG: 300 CAPSULE ORAL at 21:43

## 2021-11-30 RX ADMIN — TAZOBACTAM SODIUM AND PIPERACILLIN SODIUM 4.5 G: 500; 4 INJECTION, SOLUTION INTRAVENOUS at 23:09

## 2021-11-30 RX ADMIN — FINASTERIDE 5 MG: 5 TABLET, FILM COATED ORAL at 08:37

## 2021-11-30 RX ADMIN — TAZOBACTAM SODIUM AND PIPERACILLIN SODIUM 4.5 G: 500; 4 INJECTION, SOLUTION INTRAVENOUS at 08:38

## 2021-11-30 RX ADMIN — ALBUTEROL SULFATE 2.5 MG: 2.5 SOLUTION RESPIRATORY (INHALATION) at 08:17

## 2021-11-30 RX ADMIN — SODIUM CHLORIDE, PRESERVATIVE FREE 10 ML: 5 INJECTION INTRAVENOUS at 21:44

## 2021-11-30 RX ADMIN — POTASSIUM CHLORIDE 40 MEQ: 750 CAPSULE, EXTENDED RELEASE ORAL at 12:14

## 2021-11-30 RX ADMIN — DOXYCYCLINE 100 MG: 100 CAPSULE ORAL at 21:43

## 2021-11-30 RX ADMIN — GABAPENTIN 300 MG: 300 CAPSULE ORAL at 08:37

## 2021-11-30 RX ADMIN — PREDNISONE 5 MG: 5 TABLET ORAL at 08:37

## 2021-11-30 RX ADMIN — ATENOLOL: 50 TABLET ORAL at 08:38

## 2021-11-30 RX ADMIN — SODIUM CHLORIDE, PRESERVATIVE FREE 10 ML: 5 INJECTION INTRAVENOUS at 08:38

## 2021-11-30 RX ADMIN — ENOXAPARIN SODIUM 40 MG: 40 INJECTION SUBCUTANEOUS at 08:37

## 2021-12-01 LAB
ANION GAP SERPL CALCULATED.3IONS-SCNC: 9 MMOL/L (ref 5–15)
BUN SERPL-MCNC: 8 MG/DL (ref 8–23)
BUN/CREAT SERPL: 12.9 (ref 7–25)
CALCIUM SPEC-SCNC: 9 MG/DL (ref 8.6–10.5)
CHLORIDE SERPL-SCNC: 89 MMOL/L (ref 98–107)
CO2 SERPL-SCNC: 29 MMOL/L (ref 22–29)
CREAT SERPL-MCNC: 0.62 MG/DL (ref 0.76–1.27)
DEPRECATED RDW RBC AUTO: 42.2 FL (ref 37–54)
ERYTHROCYTE [DISTWIDTH] IN BLOOD BY AUTOMATED COUNT: 13.6 % (ref 12.3–15.4)
ERYTHROCYTE [SEDIMENTATION RATE] IN BLOOD: 35 MM/HR (ref 0–20)
GFR SERPL CREATININE-BSD FRML MDRD: 125 ML/MIN/1.73
GLUCOSE SERPL-MCNC: 82 MG/DL (ref 65–99)
HCT VFR BLD AUTO: 37.6 % (ref 37.5–51)
HGB BLD-MCNC: 12.2 G/DL (ref 13–17.7)
MAGNESIUM SERPL-MCNC: 1.8 MG/DL (ref 1.6–2.4)
MCH RBC QN AUTO: 27.3 PG (ref 26.6–33)
MCHC RBC AUTO-ENTMCNC: 32.4 G/DL (ref 31.5–35.7)
MCV RBC AUTO: 84.1 FL (ref 79–97)
PLATELET # BLD AUTO: 148 10*3/MM3 (ref 140–450)
PMV BLD AUTO: 10.1 FL (ref 6–12)
POTASSIUM SERPL-SCNC: 3.4 MMOL/L (ref 3.5–5.2)
POTASSIUM SERPL-SCNC: 4 MMOL/L (ref 3.5–5.2)
RBC # BLD AUTO: 4.47 10*6/MM3 (ref 4.14–5.8)
SODIUM SERPL-SCNC: 127 MMOL/L (ref 136–145)
WBC NRBC COR # BLD: 11.53 10*3/MM3 (ref 3.4–10.8)

## 2021-12-01 PROCEDURE — 63710000001 PREDNISONE PER 5 MG: Performed by: INTERNAL MEDICINE

## 2021-12-01 PROCEDURE — 86602 ANTINOMYCES ANTIBODY: CPT | Performed by: INTERNAL MEDICINE

## 2021-12-01 PROCEDURE — 94799 UNLISTED PULMONARY SVC/PX: CPT

## 2021-12-01 PROCEDURE — 97116 GAIT TRAINING THERAPY: CPT

## 2021-12-01 PROCEDURE — 85027 COMPLETE CBC AUTOMATED: CPT | Performed by: HOSPITALIST

## 2021-12-01 PROCEDURE — 80048 BASIC METABOLIC PNL TOTAL CA: CPT | Performed by: HOSPITALIST

## 2021-12-01 PROCEDURE — 84132 ASSAY OF SERUM POTASSIUM: CPT | Performed by: HOSPITALIST

## 2021-12-01 PROCEDURE — 99232 SBSQ HOSP IP/OBS MODERATE 35: CPT | Performed by: INTERNAL MEDICINE

## 2021-12-01 PROCEDURE — 86671 FUNGUS NES ANTIBODY: CPT | Performed by: INTERNAL MEDICINE

## 2021-12-01 PROCEDURE — 82785 ASSAY OF IGE: CPT | Performed by: INTERNAL MEDICINE

## 2021-12-01 PROCEDURE — 86038 ANTINUCLEAR ANTIBODIES: CPT | Performed by: INTERNAL MEDICINE

## 2021-12-01 PROCEDURE — 99233 SBSQ HOSP IP/OBS HIGH 50: CPT | Performed by: HOSPITALIST

## 2021-12-01 PROCEDURE — 86331 IMMUNODIFFUSION OUCHTERLONY: CPT | Performed by: INTERNAL MEDICINE

## 2021-12-01 PROCEDURE — 63710000001 PREDNISONE PER 1 MG: Performed by: INTERNAL MEDICINE

## 2021-12-01 PROCEDURE — 85652 RBC SED RATE AUTOMATED: CPT | Performed by: INTERNAL MEDICINE

## 2021-12-01 PROCEDURE — 86606 ASPERGILLUS ANTIBODY: CPT | Performed by: INTERNAL MEDICINE

## 2021-12-01 PROCEDURE — 86609 BACTERIUM ANTIBODY: CPT | Performed by: INTERNAL MEDICINE

## 2021-12-01 PROCEDURE — 94660 CPAP INITIATION&MGMT: CPT

## 2021-12-01 PROCEDURE — 25010000002 ENOXAPARIN PER 10 MG: Performed by: HOSPITALIST

## 2021-12-01 PROCEDURE — 83735 ASSAY OF MAGNESIUM: CPT | Performed by: HOSPITALIST

## 2021-12-01 PROCEDURE — 0 MAGNESIUM SULFATE 4 GM/100ML SOLUTION: Performed by: HOSPITALIST

## 2021-12-01 PROCEDURE — 63710000001 PREDNISONE PER 5 MG: Performed by: HOSPITALIST

## 2021-12-01 PROCEDURE — 25010000002 PIPERACILLIN SOD-TAZOBACTAM PER 1 G: Performed by: HOSPITALIST

## 2021-12-01 RX ORDER — MAGNESIUM SULFATE HEPTAHYDRATE 40 MG/ML
2 INJECTION, SOLUTION INTRAVENOUS AS NEEDED
Status: DISCONTINUED | OUTPATIENT
Start: 2021-12-01 | End: 2021-12-03 | Stop reason: HOSPADM

## 2021-12-01 RX ORDER — PREDNISONE 20 MG/1
40 TABLET ORAL DAILY
Status: DISCONTINUED | OUTPATIENT
Start: 2021-12-02 | End: 2021-12-03 | Stop reason: HOSPADM

## 2021-12-01 RX ORDER — ATENOLOL 50 MG/1
100 TABLET ORAL
Status: DISCONTINUED | OUTPATIENT
Start: 2021-12-01 | End: 2021-12-03 | Stop reason: HOSPADM

## 2021-12-01 RX ORDER — MAGNESIUM SULFATE HEPTAHYDRATE 40 MG/ML
4 INJECTION, SOLUTION INTRAVENOUS AS NEEDED
Status: DISCONTINUED | OUTPATIENT
Start: 2021-12-01 | End: 2021-12-03 | Stop reason: HOSPADM

## 2021-12-01 RX ADMIN — BISACODYL 5 MG: 5 TABLET, COATED ORAL at 21:19

## 2021-12-01 RX ADMIN — SODIUM CHLORIDE, PRESERVATIVE FREE 10 ML: 5 INJECTION INTRAVENOUS at 21:20

## 2021-12-01 RX ADMIN — GABAPENTIN 300 MG: 300 CAPSULE ORAL at 21:19

## 2021-12-01 RX ADMIN — FINASTERIDE 5 MG: 5 TABLET, FILM COATED ORAL at 08:48

## 2021-12-01 RX ADMIN — ALBUTEROL SULFATE 2.5 MG: 2.5 SOLUTION RESPIRATORY (INHALATION) at 13:15

## 2021-12-01 RX ADMIN — DOXYCYCLINE 100 MG: 100 CAPSULE ORAL at 21:19

## 2021-12-01 RX ADMIN — SODIUM CHLORIDE, PRESERVATIVE FREE 10 ML: 5 INJECTION INTRAVENOUS at 08:49

## 2021-12-01 RX ADMIN — DOXYCYCLINE 100 MG: 100 CAPSULE ORAL at 08:48

## 2021-12-01 RX ADMIN — TAZOBACTAM SODIUM AND PIPERACILLIN SODIUM 4.5 G: 500; 4 INJECTION, SOLUTION INTRAVENOUS at 08:48

## 2021-12-01 RX ADMIN — MAGNESIUM SULFATE HEPTAHYDRATE 4 G: 40 INJECTION, SOLUTION INTRAVENOUS at 11:23

## 2021-12-01 RX ADMIN — ALBUTEROL SULFATE 2.5 MG: 2.5 SOLUTION RESPIRATORY (INHALATION) at 19:15

## 2021-12-01 RX ADMIN — ENOXAPARIN SODIUM 40 MG: 40 INJECTION SUBCUTANEOUS at 08:48

## 2021-12-01 RX ADMIN — PREDNISONE 5 MG: 5 TABLET ORAL at 08:48

## 2021-12-01 RX ADMIN — TAZOBACTAM SODIUM AND PIPERACILLIN SODIUM 4.5 G: 500; 4 INJECTION, SOLUTION INTRAVENOUS at 16:53

## 2021-12-01 RX ADMIN — ATENOLOL: 50 TABLET ORAL at 08:49

## 2021-12-01 RX ADMIN — POTASSIUM CHLORIDE 40 MEQ: 750 CAPSULE, EXTENDED RELEASE ORAL at 11:15

## 2021-12-01 RX ADMIN — PREDNISONE 35 MG: 5 TABLET ORAL at 15:16

## 2021-12-01 RX ADMIN — POTASSIUM CHLORIDE 40 MEQ: 750 CAPSULE, EXTENDED RELEASE ORAL at 14:35

## 2021-12-01 RX ADMIN — ATORVASTATIN CALCIUM 10 MG: 10 TABLET, FILM COATED ORAL at 08:48

## 2021-12-01 RX ADMIN — ESCITALOPRAM OXALATE 20 MG: 20 TABLET ORAL at 08:48

## 2021-12-01 RX ADMIN — TAMSULOSIN HYDROCHLORIDE 0.4 MG: 0.4 CAPSULE ORAL at 18:03

## 2021-12-01 NOTE — PLAN OF CARE
Goal Outcome Evaluation:  Plan of Care Reviewed With: patient, spouse        Progress: improving  Outcome Summary: Patient continues to progress well with PT, increasing ambulation distance to 90ft then 60ft with RW and SBA, requiring 1 seated rest break due to SOA and fatigue. O2 desat to 84% and 83% on 4L O2 following each set of gait, respectively, requiring 1 min to recover each time. Will continue to progress as tolerated.

## 2021-12-01 NOTE — THERAPY TREATMENT NOTE
Patient Name: Pablo Badillo  : 1940    MRN: [id number]                              Today's Date: 2021       Admit Date: 2021    Visit Dx:     ICD-10-CM ICD-9-CM   1. Acute respiratory failure with hypoxia (HCC)  J96.01 518.81   2. Pneumonia of right upper lobe due to infectious organism  J18.9 486   3. Hypoxia  R09.02 799.02     Patient Active Problem List   Diagnosis   • Hypoxia   • Sepsis, unspecified organism (HCC)   • Pneumonia of right upper lobe due to infectious organism   • Hypokalemia   • Peripheral neuropathy   • Essential hypertension   • Hyperlipidemia   • BPH (benign prostatic hyperplasia)     Past Medical History:   Diagnosis Date   • BCC (basal cell carcinoma of skin)    • BPH (benign prostatic hyperplasia)    • GERD (gastroesophageal reflux disease)    • Hyperlipidemia    • Hypertension    • IBS (irritable bowel syndrome)    • Idiopathic peripheral neuropathy    • Peptic ulcer    • Renal cyst     left   • Thrombocytopenia (HCC)      Past Surgical History:   Procedure Laterality Date   • AMPUTATION DIGIT      tip of left third digit   • CATARACT EXTRACTION     • HEMORRHOIDECTOMY     • HYDROCELE EXCISION / REPAIR        General Information     Row Name 21 1426          Physical Therapy Time and Intention    Document Type therapy note (daily note)  -NS     Mode of Treatment individual therapy; physical therapy  -NS     Row Name 21 1426          General Information    Existing Precautions/Restrictions oxygen therapy device and L/min  -NS     Row Name 21 1426          Cognition    Orientation Status (Cognition) oriented x 3  -NS     Row Name 21 1426          Safety Issues, Functional Mobility    Safety Issues Affecting Function (Mobility) sequencing abilities  -NS     Impairments Affecting Function (Mobility) endurance/activity tolerance; sensation/sensory awareness; strength; shortness of breath  -NS           User Key  (r) = Recorded By, (t) = Taken By, (c) =  Cosigned By    Initials Name Provider Type    Maria Guadalupe Melton PT Physical Therapist               Mobility     Row Name 12/01/21 1426          Bed Mobility    Comment (Bed Mobility) Pt UIC  -NS     Row Name 12/01/21 1426          Transfers    Comment (Transfers) Appropriate hand placement.  -NS     Row Name 12/01/21 1426          Sit-Stand Transfer    Sit-Stand [place] (Transfers) standby assist  -NS     Assistive Device (Sit-Stand Transfers) walker, front-wheeled  -NS     Row Name 12/01/21 1426          Gait/Stairs (Locomotion)    [place] Level (Gait) standby assist; verbal cues  -NS     Assistive Device (Gait) walker, front-wheeled  -NS     Distance in Feet (Gait) 90+60  -NS     Deviations/Abnormal Patterns (Gait) yaneth decreased; gait speed decreased; stride length decreased  -NS     Bilateral Gait Deviations forward flexed posture; heel strike decreased  -NS     Comment (Gait/Stairs) Patient ambulated at slow pace, demonstrating SOA throughout gait. He required cues for safety with turns, PLB, and avoiding excessive talking for energy conservation. 1 seated rest break between sets of gait. O2 desat to 84%, then 83% following each set of gait respectively, requiring 1 min to recover each time on 4L O2.  -NS           User Key  (r) = Recorded By, (t) = Taken By, (c) = Cosigned By    Initials Name Provider Type    Maria Guadalupe Melton PT Physical Therapist               Obj/Interventions     Row Name 12/01/21 1426          Balance    Balance Assessment sitting static balance; standing static balance; sitting dynamic balance; standing dynamic balance  -NS     Static Sitting Balance WFL; unsupported; sitting in chair  -NS     Dynamic Sitting Balance WFL; unsupported; sitting in chair  -NS     Static Standing Balance WFL; supported; standing  -NS     Dynamic Standing Balance WFL; supported; standing  -NS           User Key  (r) = Recorded By, (t) = Taken By, (c) = Cosigned By    Initials Name Provider Type     Maria Guadalupe Melton PT Physical Therapist               Goals/Plan    No documentation.                Clinical Impression     Row Name 12/01/21 1426          Pain    Additional Documentation Pain Scale: Numbers Pre/Post-Treatment (Group)  -NS     Row Name 12/01/21 1426          Pain Scale: Numbers Pre/Post-Treatment    Pretreatment Pain Rating 0/10 - no pain  -NS     Posttreatment Pain Rating 0/10 - no pain  -NS     Row Name 12/01/21 1426          Plan of Care Review    Plan of Care Reviewed With patient; spouse  -NS     Progress improving  -NS     Outcome Summary Patient continues to progress well with PT, increasing ambulation distance to 90ft then 60ft with RW and SBA, requiring 1 seated rest break due to SOA and fatigue. O2 desat to 84% and 83% on 4L O2 following each set of gait, respectively, requiring 1 min to recover each time. Will continue to progress as tolerated.  -NS     Row Name 12/01/21 1426          Vital Signs    Pre SpO2 (%) 94  -NS     O2 Delivery Pre Treatment nasal cannula  -NS     Intra SpO2 (%) 83  -NS     O2 Delivery Intra Treatment nasal cannula  -NS     Post SpO2 (%) 92  -NS     O2 Delivery Post Treatment nasal cannula  -NS     Pre Patient Position Sitting  -NS     Intra Patient Position Standing  -NS     Post Patient Position Sitting  -NS     [facility] Name 12/01/21 1426          Positioning and Restraints    Pre-Treatment Position sitting in chair/recliner  -NS     Post Treatment Position chair  -NS     In Chair notified nsg; sitting; call light within reach; encouraged to call for assist; with nsg; with family/caregiver  RN deferred alarm  -NS           User Key  (r) = Recorded By, (t) = Taken By, (c) = Cosigned By    Initials Name Provider Type    Maria Guadalupe Melton PT Physical Therapist               Outcome Measures     Row Name 12/01/21 1426 12/01/21 0849       How much help from another person do you currently need...    Turning from your back to your side while in flat bed without using  bedrails? 4  -NS 4  -MP    Moving from lying on back to sitting on the side of a flat bed without bedrails? 3  -NS 3  -MP    Moving to and from a bed to a chair (including a wheelchair)? 3  -NS 3  -MP    Standing up from a chair using your arms (e.g., wheelchair, bedside chair)? 4  -NS 3  -MP    Climbing 3-5 steps with a railing? 3  -NS 3  -MP    To walk in hospital room? 3  -NS 4  -MP    AM-PAC 6 Clicks Score (PT) 20  -NS 20  -MP    Row Name 12/01/21 1426          Functional Assessment    Outcome Measure Options AM-PAC 6 Clicks Basic Mobility (PT)  -NS           User Key  (r) = Recorded By, (t) = Taken By, (c) = Cosigned By    Initials Name Provider Type    Melissa Gonzalez RN Registered Nurse    Maria Guadalupe Melton, PT Physical Therapist                             Physical Therapy Education                 Title: PT OT SLP Therapies (Done)     Topic: Physical Therapy (Done)     Point: Mobility training (Done)     Learning Progress Summary           Patient Acceptance, E, VU by NS at 12/1/2021 1543    Acceptance, E,D, VU,DU by MB at 11/28/2021 1454   Family Acceptance, E,D, VU,DU by MB at 11/28/2021 1454                   Point: Home exercise program (Done)     Learning Progress Summary           Patient Acceptance, E, VU by NS at 12/1/2021 1543    Acceptance, E,D, VU,DU by MB at 11/28/2021 1454   Family Acceptance, E,D, VU,DU by MB at 11/28/2021 1454                   Point: Body mechanics (Done)     Learning Progress Summary           Patient Acceptance, E, VU by NS at 12/1/2021 1543    Acceptance, E,D, VU,DU by MB at 11/28/2021 1454   Family Acceptance, E,D, VU,DU by MB at 11/28/2021 1454                   Point: Precautions (Done)     Learning Progress Summary           Patient Acceptance, E, VU by NS at 12/1/2021 1543    Acceptance, E,D, VU,DU by MB at 11/28/2021 1454   Family Acceptance, E,D, VU,DU by MB at 11/28/2021 1454                               User Key     Initials Effective Dates Name Provider  Type Discipline    MB 06/16/21 -  Melissa Clancy PT Physical Therapist PT    NS 06/16/21 -  Maria Guadalupe Malloy PT Physical Therapist PT              PT Recommendation and Plan     Plan of Care Reviewed With: patient, spouse  Progress: improving  Outcome Summary: Patient continues to progress well with PT, increasing ambulation distance to 90ft then 60ft with RW and SBA, requiring 1 seated rest break due to SOA and fatigue. O2 desat to 84% and 83% on 4L O2 following each set of gait, respectively, requiring 1 min to recover each time. Will continue to progress as tolerated.     Time Calculation:    PT Charges     Row Name 12/01/21 1426             Time Calculation    Start Time 1426  -NS      PT Received On 12/01/21  -NS      PT Goal Re-Cert Due Date 12/08/21  -NS              Timed Charges    49969 - Gait Training Minutes  30  -NS              Total Minutes    Timed Charges Total Minutes 30  -NS       Total Minutes 30  -NS            User Key  (r) = Recorded By, (t) = Taken By, (c) = Cosigned By    Initials Name Provider Type    NS Maria Guadalupe Malloy PT Physical Therapist              Therapy Charges for Today     Code Description Service Date Service Provider Modifiers Qty    55952778268 HC GAIT TRAINING EA 15 MIN 12/1/2021 Maria Guadalupe Malloy, PT GP 2          PT G-Codes  Outcome Measure Options: AM-PAC 6 Clicks Basic Mobility (PT)  AM-PAC 6 Clicks Score (PT): 20  AM-PAC 6 Clicks Score (OT): 20    Maria Guadalupe Malloy PT  12/1/2021

## 2021-12-01 NOTE — PROGRESS NOTES
Pulmonary Hospital Follow-up     Hospital:  LOS: 4 days   Mr. Pablo Badillo, 81 y.o. male is followed for:   Pneumonia of right upper lobe due to infectious organism            History of present illness:   81-year-old male with history of hypertension, dyslipidemia, BPH, chronic thrombocytopenia, GERD, rheumatoid arthritis and peripheral neuropathy presented with few days history of dizziness and worsening shortness of breath.  Patient states that he has been told that he has sleep apnea 20 years ago and he was put on CPAP but he never could get used to it.  Lately is having problem with neuropathy and has been started on prednisone and gabapentin.  Patient states that he has known that his oxygen saturations dropped if he is not breathing deeply and lately he was noticing that he is getting more dizzy for about a week and his oxygen saturations are dropping into 80s so he presented to the emergency room.  He was found to be tachycardic with fever and also was hypoxic with lactic acidosis and leukocytosis.  Patient was started on antibiotics, oxygen therapy as well as gabapentin dose was decreased considering the cause of his dizziness.  CT angiogram was done which showed no evidence of pulmonary embolism.  Groundglass opacities identified in the right upper lung field.  There are some cystic changes noted in the upper lobes along with some groundglass opacities.  Patient states that he used to smoke 3 packs a day for 20 years but quit about 30 years ago.  Denies any other ongoing exposures. Does have some cough which is mainly dry not producing any sputum.  Today is the first day states that he produce some sputum.  Denies any sputum.  Denies any fevers, chills or night sweats.  Denies any wheezing or chest tightness.  His cultures are negative thus far.      Subjective   Interval History:  Patient states that he slept well overnight.  Today morning is down to 3 L nasal cannula oxygen and tolerating well.  He  feels that his breathing is much better.  He used CPAP overnight and felt that his sleep was much better quality than before.                 The patient's past medical, surgical and social history were reviewed and updated in Epic as appropriate.       Objective     Infusions:     Medications:  albuterol, 2.5 mg, Nebulization, Q6H - RT  atenolol, 100 mg, Oral, Q24H  atorvastatin, 10 mg, Oral, Daily  doxycycline, 100 mg, Oral, Q12H  enoxaparin, 40 mg, Subcutaneous, Daily  escitalopram, 20 mg, Oral, Daily  finasteride, 5 mg, Oral, Daily  gabapentin, 300 mg, Oral, Nightly  piperacillin-tazobactam, 4.5 g, Intravenous, Q8H  predniSONE, 35 mg, Oral, Once  [START ON 12/2/2021] predniSONE, 40 mg, Oral, Daily  sodium chloride, 10 mL, Intravenous, Q12H  tamsulosin, 0.4 mg, Oral, Q24H        Vital Sign Min/Max for last 24 hours  Temp  Min: 97.7 °F (36.5 °C)  Max: 98.6 °F (37 °C)   BP  Min: 114/66  Max: 160/88   Pulse  Min: 61  Max: 88   Resp  Min: 18  Max: 27   SpO2  Min: 88 %  Max: 98 %   Flow (L/min)  Min: 3  Max: 6       Input/Output for last 24 hour shift  11/30 0701 - 12/01 0700  In: 1440 [P.O.:1440]  Out: 400 [Urine:400]      Objective  General Appearance: Awake, alert, in no acute distress on nasal canula O2       Lungs:   B/L Breath sounds present with decreased breath sounds on bases, no wheezing heard, no crackles.   Heart: S1 and S2 present, no murmur  Abdomen: Soft, non-tender, no guarding or rigidity, bowel sounds positive.  Extremities: Atraumatic, no cyanosis or clubbing,  no edema, warm to touch.  Neurologic:  Moving all four extremities. Good strength bilaterally.  Psychologic: Appropriate affect, Cooperative.       Results from last 7 days   Lab Units 12/01/21  0518 11/30/21  0524 11/29/21  0346   WBC 10*3/mm3 11.53* 8.92 11.63*   HEMOGLOBIN g/dL 12.2* 11.7* 12.2*   PLATELETS 10*3/mm3 148 120* 138*     Results from last 7 days   Lab Units 12/01/21  0518 11/30/21  1626 11/30/21  0524 11/29/21  0346  11/29/21  0346   SODIUM mmol/L 127*  --  131*  --  132*   POTASSIUM mmol/L 3.4* 4.0 3.3*   < > 3.8  3.8   CO2 mmol/L 29.0  --  25.0  --  25.0   BUN mg/dL 8  --  9  --  12   CREATININE mg/dL 0.62*  --  0.59*  --  0.65*   MAGNESIUM mg/dL 1.8  --   --   --   --    GLUCOSE mg/dL 82  --  84  --  118*    < > = values in this interval not displayed.     Estimated Creatinine Clearance: 87.5 mL/min (A) (by C-G formula based on SCr of 0.62 mg/dL (L)).          Images:   No new Chest x-ray.    I reviewed the patient's results and images.     Assessment/Plan   Impression        Pneumonia of right upper lobe due to infectious organism    Hypoxia    Sepsis, unspecified organism (HCC)    Hypokalemia    Peripheral neuropathy    Essential hypertension    Hyperlipidemia    BPH (benign prostatic hyperplasia)       Plan        1.  Patient presented with sepsis picture.  Found to have abnormal cluster of cystic can groundglass opacities in the right upper lobe area.  We are checking for hypersensitivity pneumonitis.  Patient does have rheumatoid arthritis and sees rheumatology as outpatient.  Rheumatoid lung is another possibility with worsening inflammation.  Sed rate is borderline elevated.  At this point I am waiting for hypersensitivity profile, PETER screen.  We will increase prednisone dose to 40 mg daily for now and see if we can control inflammation and hopefully improve his hypoxemia.  2.  Diastolic dysfunction. However, seems euvolemic on exam. Na level dropped and will need to be monitored closely.   3.  Continue antibiotics empirically for now.  4.  Will need outpatient sleep study.  Likely patient will undergo leaving hospital on oxygen therapy and would not be candidate for home sleep apnea testing.  Reviewed this with the patient and he is amenable to coming into the lab and will arrange for that prior to his discharge.        Dariusz Ferro MD, FCCP  Pulmonary, Critical care and Sleep Medicine

## 2021-12-01 NOTE — PLAN OF CARE
Goal Outcome Evaluation:  Plan of Care Reviewed With: patient        Progress: no change  Outcome Summary: Pt remained on 4L NC until approx 0115 this AM. Pt required being increased to 6L NC-- pt was still satting in mid-low 80s while asleep. PA notified and new order received for CPAP. Otherwise, VSS with no complaints this shift.

## 2021-12-01 NOTE — PROGRESS NOTES
[facility] Medicine Services  PROGRESS NOTE    Patient Name: Pablo Badillo  : 1940  MRN: [id number]    Date of Admission: 2021  Primary Care Physician: Schumer, Barry, MD    Subjective   Subjective     CC:  F/U dizziness, SOA    HPI:  Patient had CPAP ordered last night to try as he was needing 6 liters again, and he says he slept very well. Back on 3 liters today. He is overall feeling better, other than weak--wants to walk and see PT again. Interested in home health at discharge.    ROS:  Gen-no fevers, no chills  CV-no chest pain, no palpitations  Resp-no cough, improved dyspnea  GI-no N/V/D, no abd pain    All other systems reviewed and negative except any additional pertinent positives and negatives as discussed in HPI.      Objective   Objective     Vital Signs:   Temp:  [97.5 °F (36.4 °C)-98.6 °F (37 °C)] 97.6 °F (36.4 °C)  Heart Rate:  [61-88] 73  Resp:  [18-27] 18  BP: (105-160)/(56-88) 105/56  Flow (L/min):  [3-6] 3     Physical Exam:  Gen-no acute distress  HENT-NCAT, mucous membranes moist  CV-RRR, S1 S2 normal, no m/r/g  Resp-diminished bilaterally, no wheezes or rales, nonlabored on 3 liters  Abd-soft, NT, ND, +BS  Ext-no edema  Neuro-A&Ox3, no focal deficits  Skin-no rashes  Psych-appropriate mood      Results Reviewed:  LAB RESULTS:      Lab 21  0518 21  0524 21  0346 21  0657 21  1408 21  0837   WBC 11.53* 8.92 11.63* 11.48*  --  12.22*   HEMOGLOBIN 12.2* 11.7* 12.2* 12.3*  --  13.1   HEMATOCRIT 37.6 37.7 37.5 38.0  --  41.4   PLATELETS 148 120* 138* 113*  --  105*   NEUTROS ABS  --   --   --   --   --  9.76*   IMMATURE GRANS (ABS)  --   --   --   --   --  0.10*   LYMPHS ABS  --   --   --   --   --  0.64*   MONOS ABS  --   --   --   --   --  1.47*   EOS ABS  --   --   --   --   --  0.18   MCV 84.1 88.3 84.3 85.2  --  86.6   SED RATE 35*  --   --   --   --   --    PROCALCITONIN  --   --   --   --   --  0.07   LACTATE  --   --    --   --  1.4 2.5*         Lab 12/01/21  0518 11/30/21  1626 11/30/21  0524 11/29/21  0346 11/28/21  0658 11/27/21  0837 11/27/21  0837   SODIUM 127*  --  131* 132* 134*  --  135*   POTASSIUM 3.4* 4.0 3.3* 3.8  3.8 3.5   < > 3.3*   CHLORIDE 89*  --  95* 96* 96*  --  94*   CO2 29.0  --  25.0 25.0 30.0*  --  31.0*   ANION GAP 9.0  --  11.0 11.0 8.0  --  10.0   BUN 8  --  9 12 11  --  9   CREATININE 0.62*  --  0.59* 0.65* 0.65*  --  0.64*   GLUCOSE 82  --  84 118* 98  --  117*   CALCIUM 9.0  --  8.8 8.8 8.9  --  8.9   MAGNESIUM 1.8  --   --   --   --   --   --     < > = values in this interval not displayed.         Lab 11/27/21  0837   TOTAL PROTEIN 6.6   ALBUMIN 3.90   GLOBULIN 2.7   ALT (SGPT) 24   AST (SGOT) 23   BILIRUBIN 0.7   ALK PHOS 52         Lab 11/29/21  0346 11/27/21  0837   PROBNP 100.7 402.5   TROPONIN T  --  <0.010                 Brief Urine Lab Results     None          Microbiology Results Abnormal     Procedure Component Value - Date/Time    Respiratory Culture - Sputum, Cough [[id number]] Collected: 11/30/21 0956    Lab Status: Preliminary result Specimen: Sputum from Cough Updated: 12/01/21 1338     Respiratory Culture Moderate growth (3+) The culture consists of normal respiratory saritha. This is a preliminary report; final report to follow.     Gram Stain Moderate (3+) WBCs per low power field      Few (2+) Epithelial cells per low power field      No organisms seen    Blood Culture - Blood, Arm, Left [[id number]]  (Normal) Collected: 11/27/21 0940    Lab Status: Preliminary result Specimen: Blood from Arm, Left Updated: 12/01/21 1015     Blood Culture No growth at 4 days    Blood Culture - Blood, Arm, Left [[id number]]  (Normal) Collected: 11/27/21 0993    Lab Status: Preliminary result Specimen: Blood from Arm, Left Updated: 12/01/21 1015     Blood Culture No growth at 4 days    S. Pneumo Ag Urine or CSF - Urine, Urine, Clean Catch [[id number]]  (Normal) Collected: 11/27/21 1557    Lab Status:  Final result Specimen: Urine, Clean Catch Updated: 11/27/21 2112     Strep Pneumo Ag Negative    Legionella Antigen, Urine - Urine, Urine, Clean Catch [[id number]]  (Normal) Collected: 11/27/21 1557    Lab Status: Final result Specimen: Urine, Clean Catch Updated: 11/27/21 2112     LEGIONELLA ANTIGEN, URINE Negative    MRSA Screen, PCR (Inpatient) - Swab, Nares [[id number]]  (Normal) Collected: 11/27/21 1557    Lab Status: Final result Specimen: Swab from Nares Updated: 11/27/21 1736     MRSA PCR Negative    Narrative:      MRSA Negative    COVID PRE-OP / PRE-PROCEDURE SCREENING ORDER (NO ISOLATION) - Swab, Nasopharynx [[id number]]  (Normal) Collected: 11/27/21 0940    Lab Status: Final result Specimen: Swab from Nasopharynx Updated: 11/27/21 1012    Narrative:      The following orders were created for panel order COVID PRE-OP / PRE-PROCEDURE SCREENING ORDER (NO ISOLATION) - Swab, Nasopharynx.  Procedure                               Abnormality         Status                     ---------                               -----------         ------                     COVID-19 and FLU A/B PCR...[[id number]]  Normal              Final result                 Please view results for these tests on the individual orders.    COVID-19 and FLU A/B PCR - Swab, Nasopharynx [[id number]]  (Normal) Collected: 11/27/21 0940    Lab Status: Final result Specimen: Swab from Nasopharynx Updated: 11/27/21 1012     COVID19 Not Detected     Influenza A PCR Not Detected     Influenza B PCR Not Detected    Narrative:      Fact sheet for providers: https://www.fda.gov/media/893334/download    Fact sheet for patients: https://www.fda.gov/media/948059/download    Test performed by PCR.          No radiology results from the last 24 hrs    Results for orders placed during the hospital encounter of 11/27/21    Adult Transthoracic Echo Complete W/ Cont if Necessary Per Protocol    Interpretation Summary  · Estimated left ventricular EF = 55% Left  ventricular systolic function is normal.  · Left ventricular diastolic function is consistent with (grade I) impaired relaxation.  · Trace aortic valve regurgitation is present.  · Trace mitral valve regurgitation is present  · Trace to mild tricuspid valve regurgitation is present      I have reviewed the medications:  Scheduled Meds:albuterol, 2.5 mg, Nebulization, Q6H - RT  atenolol, 100 mg, Oral, Q24H  atorvastatin, 10 mg, Oral, Daily  doxycycline, 100 mg, Oral, Q12H  enoxaparin, 40 mg, Subcutaneous, Daily  escitalopram, 20 mg, Oral, Daily  finasteride, 5 mg, Oral, Daily  gabapentin, 300 mg, Oral, Nightly  piperacillin-tazobactam, 4.5 g, Intravenous, Q8H  predniSONE, 35 mg, Oral, Once  [START ON 12/2/2021] predniSONE, 40 mg, Oral, Daily  sodium chloride, 10 mL, Intravenous, Q12H  tamsulosin, 0.4 mg, Oral, Q24H      Continuous Infusions:   PRN Meds:.•  acetaminophen  •  albuterol  •  [DISCONTINUED] senna-docusate sodium **AND** polyethylene glycol **AND** bisacodyl **AND** bisacodyl  •  magnesium sulfate **OR** magnesium sulfate **OR** magnesium sulfate  •  melatonin  •  ondansetron  •  potassium chloride **OR** potassium chloride **OR** potassium chloride  •  sodium chloride  •  sodium chloride    Assessment/Plan   Assessment & Plan     Active Hospital Problems    Diagnosis  POA   • **Pneumonia of right upper lobe due to infectious organism [J18.9]  Yes   • Hypoxia [R09.02]  Yes   • Sepsis, unspecified organism (HCC) [A41.9]  Yes   • Hypokalemia [E87.6]  Yes   • Peripheral neuropathy [G62.9]  Yes   • Essential hypertension [I10]  Yes   • Hyperlipidemia [E78.5]  Yes   • BPH (benign prostatic hyperplasia) [N40.0]  Yes      Resolved Hospital Problems   No resolved problems to display.        Brief Hospital Course to date:  Pablo Badillo is a 81 y.o. male with hx of HTN, HLD, BPH, chronic thrombocytopenia, GERD, arthritis, and peripheral neuropathy who presents due to several day history of worsening dizziness  mostly in the mornings when he wakes up, post-nasal drip, dry cough, and shortness of breath. Found to have hypoxia with sepsis and RUL pneumonia.      Sepsis, POA (tachycardia, fever, hypoxia, lactic acidosis, leukocytosis)  Hypoxia  Pneumonia of right upper lobe vs interstitial edema  --Overall concerning for PNA rather than pulmonary edema/CHF given normal proBNP, no JVD or edema on exam.   --Continue Zosyn/Doxycycline. Negative MRSA PCR (received one time dose of Vanc in ER). Blood cultures NGTD. Negative Strep pneumo and Legionella urinary antigens. COVID and Flu PCR are negative--he is up to date on all vaccines.  --Scheduled and PRN albuterol nebs.  --Echo reviewed, with EF 55%, grade I diastolic dysfunction, trace aortic/mitral/tricuspid valve regurgitation.  --Requiring up to 6 liters, mostly at night. Trial CPAP at night. Encourage incentive spirometry.   --Repeat CXR 11/29/21 shows some prominence of the pulmonary vascularity with stable markings in the RUL. His proBNP was normal and he has no edema on exam, however. Will hold off any further diuresis.   --Suspect underlying PARUL/OHS is contributing. He has not had a sleep study in 20 years--will need to schedule one for after discharge: placed ambulatory referral in [facility] 11/30/21.  --As he continues to have high oxygen requirements and is not on oxygen at baseline, I consulted Pulmonology on 11/30/21. Discussed with Dr. Ferro today. Have sent further autoimmune workup. 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 Increase prednisone to 40 mg daily.     Dizziness, resolved  --Likely due to above--hypoxia, pneumonia.  --Also notes recent addition of gabapentin (per ANNETTE was started in June 2021), discontinuation of Lisinopril due to low BP, recent addition of prednisone taper.   --Decreased gabapentin, monitoring BP closely. Orthostatics negative.  --Echo without any significant valvular  disease.  --PT/OT.     Hypokalemia  Hypomagnesemia  --Replace per protocol today.    Hyponatremia  --Has been somewhat chronic per patient.   --Worse today.   --Will stop Chlorthalidone.  --BMP in AM.     Chronic thrombocytopenia  --Followed by PCP, has been running low lately per patient. No prior labs for comparison.   --Stable thus far, continue to monitor.     HTN  HLD  --BP has been running low lately per patient, his Lisinopril was discontinued by his PCP recently.   --Continue Atenolol-Chlorthalidone with hold parameters.  --Continue Lipitor.     Peripheral neuropathy  Rheumatoid arthritis  --Decreased gabapentin to 100 mg BID due to reported dizziness--he spoke with his rheumatologist and they advised decreasing to 300 mg once daily (from 300 mg BID)--change made 11/29/21.  --His rheumatologist also recommended decreasing his prednisone to 5 mg daily (was on 10 mg)--change made 11/29/21. However we are increasing to 40 mg as above for possible rheumatoid lung.  --PT/OT. May benefit from home health.     BPH  --Continue Flomax, Proscar.       DVT prophylaxis:  Medical DVT prophylaxis orders are present.       AM-PAC 6 Clicks Score (PT): 20 (12/01/21 5433)    Disposition: I expect the patient to be discharged home 1-2 days if stable on 3 liters or less oxygen    CODE STATUS:   Code Status and Medical Interventions:   Ordered at: 11/27/21 1448     Code Status (Patient has no pulse and is not breathing):    CPR (Attempt to Resuscitate)     Medical Interventions (Patient has pulse or is breathing):    Full Support       Rebeka Edmondson MD  12/01/21

## 2021-12-01 NOTE — CASE MANAGEMENT/SOCIAL WORK
Continued Stay Note  [facility]     Patient Name: Pablo Badillo  MRN: [id number]  Today's Date: 12/1/2021    Admit Date: 11/27/2021     Discharge Plan     Row Name 12/01/21 1445       Plan    Plan update    Patient/Family in Agreement with Plan yes    Plan Comments Per MDR, patient has been on 4LO2NC.  Spoke with patient at bedside regarding discharge plan, patient now on 3LO2NC.  Discussed DME providers, patient would like to use [facility].  No additional discharge needs verbalzied.  CM following.  Patient plan is to discharge home via car with family to transport.    Final Discharge Disposition Code 01 - home or self-care               Discharge Codes    No documentation.               Expected Discharge Date and Time     Expected Discharge Date Expected Discharge Time    Dec 3, 2021             Charlette Gonsalez RN

## 2021-12-02 LAB
ANA SER QL: NEGATIVE
ANION GAP SERPL CALCULATED.3IONS-SCNC: 7 MMOL/L (ref 5–15)
BACTERIA SPEC AEROBE CULT: NORMAL
BACTERIA SPEC AEROBE CULT: NORMAL
BACTERIA SPEC RESP CULT: NORMAL
BUN SERPL-MCNC: 9 MG/DL (ref 8–23)
BUN/CREAT SERPL: 17 (ref 7–25)
CALCIUM SPEC-SCNC: 8.6 MG/DL (ref 8.6–10.5)
CHLORIDE SERPL-SCNC: 93 MMOL/L (ref 98–107)
CO2 SERPL-SCNC: 29 MMOL/L (ref 22–29)
CREAT SERPL-MCNC: 0.53 MG/DL (ref 0.76–1.27)
DEPRECATED RDW RBC AUTO: 41.2 FL (ref 37–54)
ERYTHROCYTE [DISTWIDTH] IN BLOOD BY AUTOMATED COUNT: 13.3 % (ref 12.3–15.4)
GFR SERPL CREATININE-BSD FRML MDRD: 149 ML/MIN/1.73
GLUCOSE SERPL-MCNC: 112 MG/DL (ref 65–99)
GRAM STN SPEC: NORMAL
HCT VFR BLD AUTO: 36 % (ref 37.5–51)
HGB BLD-MCNC: 11.6 G/DL (ref 13–17.7)
MAGNESIUM SERPL-MCNC: 2.8 MG/DL (ref 1.6–2.4)
MCH RBC QN AUTO: 27.3 PG (ref 26.6–33)
MCHC RBC AUTO-ENTMCNC: 32.2 G/DL (ref 31.5–35.7)
MCV RBC AUTO: 84.7 FL (ref 79–97)
PLATELET # BLD AUTO: 148 10*3/MM3 (ref 140–450)
PMV BLD AUTO: 10.2 FL (ref 6–12)
POTASSIUM SERPL-SCNC: 4 MMOL/L (ref 3.5–5.2)
RBC # BLD AUTO: 4.25 10*6/MM3 (ref 4.14–5.8)
SODIUM SERPL-SCNC: 129 MMOL/L (ref 136–145)
WBC NRBC COR # BLD: 10.98 10*3/MM3 (ref 3.4–10.8)

## 2021-12-02 PROCEDURE — 94660 CPAP INITIATION&MGMT: CPT

## 2021-12-02 PROCEDURE — 83735 ASSAY OF MAGNESIUM: CPT | Performed by: HOSPITALIST

## 2021-12-02 PROCEDURE — 25010000002 ENOXAPARIN PER 10 MG: Performed by: HOSPITALIST

## 2021-12-02 PROCEDURE — 94799 UNLISTED PULMONARY SVC/PX: CPT

## 2021-12-02 PROCEDURE — 63710000001 PREDNISONE PER 1 MG: Performed by: INTERNAL MEDICINE

## 2021-12-02 PROCEDURE — 80048 BASIC METABOLIC PNL TOTAL CA: CPT | Performed by: HOSPITALIST

## 2021-12-02 PROCEDURE — 85027 COMPLETE CBC AUTOMATED: CPT | Performed by: HOSPITALIST

## 2021-12-02 PROCEDURE — 25010000002 PIPERACILLIN SOD-TAZOBACTAM PER 1 G: Performed by: HOSPITALIST

## 2021-12-02 PROCEDURE — 99232 SBSQ HOSP IP/OBS MODERATE 35: CPT | Performed by: HOSPITALIST

## 2021-12-02 RX ADMIN — ALBUTEROL SULFATE 2.5 MG: 2.5 SOLUTION RESPIRATORY (INHALATION) at 20:39

## 2021-12-02 RX ADMIN — TAZOBACTAM SODIUM AND PIPERACILLIN SODIUM 4.5 G: 500; 4 INJECTION, SOLUTION INTRAVENOUS at 08:09

## 2021-12-02 RX ADMIN — DOXYCYCLINE 100 MG: 100 CAPSULE ORAL at 08:08

## 2021-12-02 RX ADMIN — TAZOBACTAM SODIUM AND PIPERACILLIN SODIUM 4.5 G: 500; 4 INJECTION, SOLUTION INTRAVENOUS at 00:19

## 2021-12-02 RX ADMIN — BISACODYL 5 MG: 5 TABLET, COATED ORAL at 20:54

## 2021-12-02 RX ADMIN — ATENOLOL 100 MG: 50 TABLET ORAL at 08:08

## 2021-12-02 RX ADMIN — GABAPENTIN 300 MG: 300 CAPSULE ORAL at 20:54

## 2021-12-02 RX ADMIN — ALBUTEROL SULFATE 2.5 MG: 2.5 SOLUTION RESPIRATORY (INHALATION) at 13:24

## 2021-12-02 RX ADMIN — TAMSULOSIN HYDROCHLORIDE 0.4 MG: 0.4 CAPSULE ORAL at 17:11

## 2021-12-02 RX ADMIN — SODIUM CHLORIDE, PRESERVATIVE FREE 10 ML: 5 INJECTION INTRAVENOUS at 20:54

## 2021-12-02 RX ADMIN — ESCITALOPRAM OXALATE 20 MG: 20 TABLET ORAL at 08:08

## 2021-12-02 RX ADMIN — ALBUTEROL SULFATE 2.5 MG: 2.5 SOLUTION RESPIRATORY (INHALATION) at 06:46

## 2021-12-02 RX ADMIN — ATORVASTATIN CALCIUM 10 MG: 10 TABLET, FILM COATED ORAL at 08:08

## 2021-12-02 RX ADMIN — FINASTERIDE 5 MG: 5 TABLET, FILM COATED ORAL at 08:08

## 2021-12-02 RX ADMIN — ALBUTEROL SULFATE 2.5 MG: 2.5 SOLUTION RESPIRATORY (INHALATION) at 00:59

## 2021-12-02 RX ADMIN — ENOXAPARIN SODIUM 40 MG: 40 INJECTION SUBCUTANEOUS at 08:08

## 2021-12-02 RX ADMIN — DOXYCYCLINE 100 MG: 100 CAPSULE ORAL at 20:54

## 2021-12-02 RX ADMIN — TAZOBACTAM SODIUM AND PIPERACILLIN SODIUM 4.5 G: 500; 4 INJECTION, SOLUTION INTRAVENOUS at 17:11

## 2021-12-02 RX ADMIN — PREDNISONE 40 MG: 20 TABLET ORAL at 08:08

## 2021-12-02 NOTE — CASE MANAGEMENT/SOCIAL WORK
Continued Stay Note  [facility]     Patient Name: Pablo Badillo  MRN: [id number]  Today's Date: 12/2/2021    Admit Date: 11/27/2021     Discharge Plan     Row Name 12/02/21 1541       Plan    Plan update    Patient/Family in Agreement with Plan yes    Plan Comments Per MDR, possible discharge today.  Spoke with patient and wife at bedside regarding discharge plan who request [facility] when discharged and have decided they want to use [facility] now for Oxygen.  Patient waiting for Pulmonology to decide if he is able to go home.  No other needs verbalized.  Spoke to Meli with [facility] who can accept patient.  CM following.  Patient plan is to discharge home with [facility] via car with family to transport.    Final Discharge Disposition Code 06 - home with home health care               Discharge Codes    No documentation.               Expected Discharge Date and Time     Expected Discharge Date Expected Discharge Time    Dec 3, 2021             Charlette Gonsalez RN

## 2021-12-02 NOTE — PROGRESS NOTES
[facility] Medicine Services  PROGRESS NOTE    Patient Name: Pablo Badillo  : 1940  MRN: [id number]    Date of Admission: 2021  Primary Care Physician: Schumer, Barry, MD    Subjective   Subjective     CC:  F/U dizziness, SOA    HPI:  Patient slept well with CPAP overnight. On 4 liters this morning saturating 91-92%. He feels much better overall.     ROS:  Gen-no fevers, no chills  CV-no chest pain, no palpitations  Resp-no cough, improved dyspnea  GI-no N/V/D, no abd pain    All other systems reviewed and negative except any additional pertinent positives and negatives as discussed in HPI.      Objective   Objective     Vital Signs:   Temp:  [97.5 °F (36.4 °C)-98.1 °F (36.7 °C)] 97.9 °F (36.6 °C)  Heart Rate:  [57-73] 64  Resp:  [18-21] 21  BP: (112-136)/(54-73) 120/63  Flow (L/min):  [3-40] 4     Physical Exam:  Gen-no acute distress  HENT-NCAT, mucous membranes moist  CV-RRR, S1 S2 normal, no m/r/g  Resp-diminished bilaterally, no wheezes or rales, nonlabored on 4 liters  Abd-soft, NT, ND, +BS  Ext-no edema  Neuro-A&Ox3, no focal deficits  Skin-no rashes  Psych-appropriate mood      Results Reviewed:  LAB RESULTS:      Lab 21  0540 21  0518 21  0524 21  0346 21  0657 21  1408 21  0837 21  0837   WBC 10.98* 11.53* 8.92 11.63* 11.48*  --    < > 12.22*   HEMOGLOBIN 11.6* 12.2* 11.7* 12.2* 12.3*  --    < > 13.1   HEMATOCRIT 36.0* 37.6 37.7 37.5 38.0  --    < > 41.4   PLATELETS 148 148 120* 138* 113*  --    < > 105*   NEUTROS ABS  --   --   --   --   --   --   --  9.76*   IMMATURE GRANS (ABS)  --   --   --   --   --   --   --  0.10*   LYMPHS ABS  --   --   --   --   --   --   --  0.64*   MONOS ABS  --   --   --   --   --   --   --  1.47*   EOS ABS  --   --   --   --   --   --   --  0.18   MCV 84.7 84.1 88.3 84.3 85.2  --    < > 86.6   SED RATE  --  35*  --   --   --   --   --   --    PROCALCITONIN  --   --   --   --   --   --   --   0.07   LACTATE  --   --   --   --   --  1.4  --  2.5*    < > = values in this interval not displayed.         Lab 12/02/21  0540 12/01/21  1819 12/01/21  0518 11/30/21  1626 11/30/21  0524 11/29/21  0346 11/29/21  0346 11/28/21  0658 11/28/21  0658   SODIUM 129*  --  127*  --  131*  --  132*  --  134*   POTASSIUM 4.0 4.0 3.4* 4.0 3.3*   < > 3.8  3.8   < > 3.5   CHLORIDE 93*  --  89*  --  95*  --  96*  --  96*   CO2 29.0  --  29.0  --  25.0  --  25.0  --  30.0*   ANION GAP 7.0  --  9.0  --  11.0  --  11.0  --  8.0   BUN 9  --  8  --  9  --  12  --  11   CREATININE 0.53*  --  0.62*  --  0.59*  --  0.65*  --  0.65*   GLUCOSE 112*  --  82  --  84  --  118*  --  98   CALCIUM 8.6  --  9.0  --  8.8  --  8.8  --  8.9   MAGNESIUM 2.8*  --  1.8  --   --   --   --   --   --     < > = values in this interval not displayed.         Lab 11/27/21  0837   TOTAL PROTEIN 6.6   ALBUMIN 3.90   GLOBULIN 2.7   ALT (SGPT) 24   AST (SGOT) 23   BILIRUBIN 0.7   ALK PHOS 52         Lab 11/29/21  0346 11/27/21  0837   PROBNP 100.7 402.5   TROPONIN T  --  <0.010                 Brief Urine Lab Results     None          Microbiology Results Abnormal     Procedure Component Value - Date/Time    Respiratory Culture - Sputum, Cough [[id number]] Collected: 11/30/21 0956    Lab Status: Final result Specimen: Sputum from Cough Updated: 12/02/21 1111     Respiratory Culture Moderate growth (3+) Normal respiratory saritha. No S. aureus or Pseudomonas aeruginosa detected. Final report.     Gram Stain Moderate (3+) WBCs per low power field      Few (2+) Epithelial cells per low power field      No organisms seen    Blood Culture - Blood, Arm, Left [[id number]]  (Normal) Collected: 11/27/21 0940    Lab Status: Final result Specimen: Blood from Arm, Left Updated: 12/02/21 1015     Blood Culture No growth at 5 days    Blood Culture - Blood, Arm, Left [[id number]]  (Normal) Collected: 11/27/21 0940    Lab Status: Final result Specimen: Blood from Arm, Left  Updated: 12/02/21 1015     Blood Culture No growth at 5 days    S. Pneumo Ag Urine or CSF - Urine, Urine, Clean Catch [[id number]]  (Normal) Collected: 11/27/21 1557    Lab Status: Final result Specimen: Urine, Clean Catch Updated: 11/27/21 2112     Strep Pneumo Ag Negative    Legionella Antigen, Urine - Urine, Urine, Clean Catch [[id number]]  (Normal) Collected: 11/27/21 1557    Lab Status: Final result Specimen: Urine, Clean Catch Updated: 11/27/21 2112     LEGIONELLA ANTIGEN, URINE Negative    MRSA Screen, PCR (Inpatient) - Swab, Nares [[id number]]  (Normal) Collected: 11/27/21 1557    Lab Status: Final result Specimen: Swab from Nares Updated: 11/27/21 1736     MRSA PCR Negative    Narrative:      MRSA Negative    COVID PRE-OP / PRE-PROCEDURE SCREENING ORDER (NO ISOLATION) - Swab, Nasopharynx [[id number]]  (Normal) Collected: 11/27/21 0940    Lab Status: Final result Specimen: Swab from Nasopharynx Updated: 11/27/21 1012    Narrative:      The following orders were created for panel order COVID PRE-OP / PRE-PROCEDURE SCREENING ORDER (NO ISOLATION) - Swab, Nasopharynx.  Procedure                               Abnormality         Status                     ---------                               -----------         ------                     COVID-19 and FLU A/B PCR...[[id number]]  Normal              Final result                 Please view results for these tests on the individual orders.    COVID-19 and FLU A/B PCR - Swab, Nasopharynx [[id number]]  (Normal) Collected: 11/27/21 0940    Lab Status: Final result Specimen: Swab from Nasopharynx Updated: 11/27/21 1012     COVID19 Not Detected     Influenza A PCR Not Detected     Influenza B PCR Not Detected    Narrative:      Fact sheet for providers: https://www.fda.gov/media/819331/download    Fact sheet for patients: https://www.fda.gov/media/945547/download    Test performed by PCR.          No radiology results from the last 24 hrs    Results for orders placed during  the hospital encounter of 11/27/21    Adult Transthoracic Echo Complete W/ Cont if Necessary Per Protocol    Interpretation Summary  · Estimated left ventricular EF = 55% Left ventricular systolic function is normal.  · Left ventricular diastolic function is consistent with (grade I) impaired relaxation.  · Trace aortic valve regurgitation is present.  · Trace mitral valve regurgitation is present  · Trace to mild tricuspid valve regurgitation is present      I have reviewed the medications:  Scheduled Meds:albuterol, 2.5 mg, Nebulization, Q6H - RT  atenolol, 100 mg, Oral, Q24H  atorvastatin, 10 mg, Oral, Daily  doxycycline, 100 mg, Oral, Q12H  enoxaparin, 40 mg, Subcutaneous, Daily  escitalopram, 20 mg, Oral, Daily  finasteride, 5 mg, Oral, Daily  gabapentin, 300 mg, Oral, Nightly  piperacillin-tazobactam, 4.5 g, Intravenous, Q8H  predniSONE, 40 mg, Oral, Daily  sodium chloride, 10 mL, Intravenous, Q12H  tamsulosin, 0.4 mg, Oral, Q24H      Continuous Infusions:   PRN Meds:.•  acetaminophen  •  albuterol  •  [DISCONTINUED] senna-docusate sodium **AND** polyethylene glycol **AND** bisacodyl **AND** bisacodyl  •  magnesium sulfate **OR** magnesium sulfate **OR** magnesium sulfate  •  melatonin  •  ondansetron  •  potassium chloride **OR** potassium chloride **OR** potassium chloride  •  sodium chloride  •  sodium chloride    Assessment/Plan   Assessment & Plan     Active Hospital Problems    Diagnosis  POA   • **Pneumonia of right upper lobe due to infectious organism [J18.9]  Yes   • Hypoxia [R09.02]  Yes   • Sepsis, unspecified organism (HCC) [A41.9]  Yes   • Hypokalemia [E87.6]  Yes   • Peripheral neuropathy [G62.9]  Yes   • Essential hypertension [I10]  Yes   • Hyperlipidemia [E78.5]  Yes   • BPH (benign prostatic hyperplasia) [N40.0]  Yes      Resolved Hospital Problems   No resolved problems to display.        Brief Hospital Course to date:  Pablo Badillo is a 81 y.o. male with hx of HTN, HLD, BPH, chronic  thrombocytopenia, GERD, arthritis, and peripheral neuropathy who presents due to several day history of worsening dizziness mostly in the mornings when he wakes up, post-nasal drip, dry cough, and shortness of breath. Found to have hypoxia with sepsis and RUL pneumonia.      Sepsis, POA (tachycardia, fever, hypoxia, lactic acidosis, leukocytosis)  Hypoxia  Pneumonia of right upper lobe vs interstitial edema  --Overall concerning for PNA rather than pulmonary edema/CHF given normal proBNP, no JVD or edema on exam.   --Continue Zosyn/Doxycycline. Negative MRSA PCR (received one time dose of Vanc in ER). Blood cultures NGTD. Negative Strep pneumo and Legionella urinary antigens. COVID and Flu PCR are negative--he is up to date on all vaccines.  --Scheduled and PRN albuterol nebs.  --Echo reviewed, with EF 55%, grade I diastolic dysfunction, trace aortic/mitral/tricuspid valve regurgitation.  --Requiring up to 6 liters, mostly at night. Trial CPAP at night. Encourage incentive spirometry.   --Repeat CXR 11/29/21 shows some prominence of the pulmonary vascularity with stable markings in the RUL. His proBNP was normal and he has no edema on exam, however. Will hold off any further diuresis.   --Suspect underlying PARUL/OHS is contributing. He has not had a sleep study in 20 years--will need to schedule one for after discharge: placed ambulatory referral in [facility] 11/30/21.  --As he continued to have high oxygen requirements and is not on oxygen at baseline, consulted Pulmonology on 11/30/21. Discussed with Dr. Ferro. Have sent further autoimmune workup. PETER is negative. Remained concerned for possible rheumatoid lung given his changes on CT scan. Increased prednisone to 40 mg daily on 12/1/21.    Dizziness, resolved  --Likely due to above--hypoxia, pneumonia.  --Also notes recent addition of gabapentin (per ANNETTE was started in June 2021), discontinuation of Lisinopril due to low BP, recent addition of prednisone taper.    --Decreased gabapentin, monitoring BP closely. Orthostatics negative.  --Echo without any significant valvular disease.  --PT/OT.     Hypokalemia  Hypomagnesemia  --Replaced per protocol with improvement.    Hyponatremia  --Has been somewhat chronic per patient.   --Stopped Chlorthalidone 12/1/21.  --Improved today, continue to monitor.    Chronic thrombocytopenia  --Followed by PCP, has been running low lately per patient. No prior labs for comparison.   --Stable thus far, continue to monitor.     HTN  HLD  --BP has been running low lately per patient, his Lisinopril was discontinued by his PCP recently.   --Continue Atenolol-Chlorthalidone with hold parameters.  --Continue Lipitor.     Peripheral neuropathy  Rheumatoid arthritis  --Decreased gabapentin to 100 mg BID due to reported dizziness--he spoke with his rheumatologist and they advised decreasing to 300 mg once daily (from 300 mg BID)--change made 11/29/21.  --His rheumatologist also recommended decreasing his prednisone to 5 mg daily (was on 10 mg)--change made 11/29/21. However we are increasing to 40 mg as above for possible rheumatoid lung.  --PT/OT. May benefit from home health.     BPH  --Continue Flomax, Proscar.       DVT prophylaxis:  Medical DVT prophylaxis orders are present.       AM-PAC 6 Clicks Score (PT): 20 (12/01/21 1426)    Disposition: I expect the patient to be discharged home possibly later today vs tomorrow, await Pulmonology final recommendations; CM to arrange home oxygen    CODE STATUS:   Code Status and Medical Interventions:   Ordered at: 11/27/21 7631     Code Status (Patient has no pulse and is not breathing):    CPR (Attempt to Resuscitate)     Medical Interventions (Patient has pulse or is breathing):    Full Support       Rebeka Edmondson MD  12/02/21

## 2021-12-02 NOTE — PLAN OF CARE
Goal Outcome Evaluation:  Plan of Care Reviewed With: patient        Progress: no change  Outcome Summary: Pt remains on 3LNC and CPAP at HS. Pt states he sleeps much better with CPAP. VSS. No events overnight.

## 2021-12-03 VITALS
DIASTOLIC BLOOD PRESSURE: 65 MMHG | WEIGHT: 230 LBS | BODY MASS INDEX: 32.93 KG/M2 | SYSTOLIC BLOOD PRESSURE: 110 MMHG | RESPIRATION RATE: 18 BRPM | TEMPERATURE: 97.5 F | HEIGHT: 70 IN | OXYGEN SATURATION: 89 % | HEART RATE: 68 BPM

## 2021-12-03 DIAGNOSIS — G47.33 OSA (OBSTRUCTIVE SLEEP APNEA): Primary | ICD-10-CM

## 2021-12-03 PROBLEM — A41.9 SEPSIS, UNSPECIFIED ORGANISM: Status: RESOLVED | Noted: 2021-11-27 | Resolved: 2021-12-03

## 2021-12-03 PROBLEM — E87.6 HYPOKALEMIA: Status: RESOLVED | Noted: 2021-11-27 | Resolved: 2021-12-03

## 2021-12-03 PROBLEM — J18.9 PNEUMONIA OF RIGHT UPPER LOBE DUE TO INFECTIOUS ORGANISM: Status: RESOLVED | Noted: 2021-11-27 | Resolved: 2021-12-03

## 2021-12-03 LAB
ANION GAP SERPL CALCULATED.3IONS-SCNC: 10 MMOL/L (ref 5–15)
BUN SERPL-MCNC: 11 MG/DL (ref 8–23)
BUN/CREAT SERPL: 18 (ref 7–25)
CALCIUM SPEC-SCNC: 9 MG/DL (ref 8.6–10.5)
CHLORIDE SERPL-SCNC: 94 MMOL/L (ref 98–107)
CO2 SERPL-SCNC: 27 MMOL/L (ref 22–29)
CREAT SERPL-MCNC: 0.61 MG/DL (ref 0.76–1.27)
DEPRECATED RDW RBC AUTO: 40.2 FL (ref 37–54)
ERYTHROCYTE [DISTWIDTH] IN BLOOD BY AUTOMATED COUNT: 13.3 % (ref 12.3–15.4)
GFR SERPL CREATININE-BSD FRML MDRD: 127 ML/MIN/1.73
GLUCOSE SERPL-MCNC: 78 MG/DL (ref 65–99)
HCT VFR BLD AUTO: 34.9 % (ref 37.5–51)
HGB BLD-MCNC: 11.3 G/DL (ref 13–17.7)
MCH RBC QN AUTO: 27 PG (ref 26.6–33)
MCHC RBC AUTO-ENTMCNC: 32.4 G/DL (ref 31.5–35.7)
MCV RBC AUTO: 83.5 FL (ref 79–97)
PLATELET # BLD AUTO: 214 10*3/MM3 (ref 140–450)
PMV BLD AUTO: 10 FL (ref 6–12)
POTASSIUM SERPL-SCNC: 4 MMOL/L (ref 3.5–5.2)
RBC # BLD AUTO: 4.18 10*6/MM3 (ref 4.14–5.8)
SODIUM SERPL-SCNC: 131 MMOL/L (ref 136–145)
WBC NRBC COR # BLD: 12.58 10*3/MM3 (ref 3.4–10.8)

## 2021-12-03 PROCEDURE — 94660 CPAP INITIATION&MGMT: CPT

## 2021-12-03 PROCEDURE — 25010000002 ENOXAPARIN PER 10 MG: Performed by: HOSPITALIST

## 2021-12-03 PROCEDURE — 80048 BASIC METABOLIC PNL TOTAL CA: CPT | Performed by: HOSPITALIST

## 2021-12-03 PROCEDURE — 99239 HOSP IP/OBS DSCHRG MGMT >30: CPT | Performed by: HOSPITALIST

## 2021-12-03 PROCEDURE — 25010000002 PIPERACILLIN SOD-TAZOBACTAM PER 1 G: Performed by: HOSPITALIST

## 2021-12-03 PROCEDURE — 99232 SBSQ HOSP IP/OBS MODERATE 35: CPT | Performed by: INTERNAL MEDICINE

## 2021-12-03 PROCEDURE — 94799 UNLISTED PULMONARY SVC/PX: CPT

## 2021-12-03 PROCEDURE — 85027 COMPLETE CBC AUTOMATED: CPT | Performed by: HOSPITALIST

## 2021-12-03 PROCEDURE — 97535 SELF CARE MNGMENT TRAINING: CPT

## 2021-12-03 PROCEDURE — 63710000001 PREDNISONE PER 1 MG: Performed by: INTERNAL MEDICINE

## 2021-12-03 RX ORDER — AMOXICILLIN AND CLAVULANATE POTASSIUM 875; 125 MG/1; MG/1
1 TABLET, FILM COATED ORAL 2 TIMES DAILY
Qty: 4 TABLET | Refills: 0 | Status: SHIPPED | OUTPATIENT
Start: 2021-12-03 | End: 2021-12-05

## 2021-12-03 RX ORDER — ATENOLOL 100 MG/1
100 TABLET ORAL
Qty: 30 TABLET | Refills: 0 | Status: SHIPPED | OUTPATIENT
Start: 2021-12-04 | End: 2022-01-03

## 2021-12-03 RX ORDER — GABAPENTIN 300 MG/1
300 CAPSULE ORAL NIGHTLY
Start: 2021-12-03

## 2021-12-03 RX ORDER — PREDNISONE 20 MG/1
TABLET ORAL
Qty: 31 TABLET | Refills: 0 | Status: SHIPPED | OUTPATIENT
Start: 2021-12-05 | End: 2021-12-31

## 2021-12-03 RX ADMIN — FINASTERIDE 5 MG: 5 TABLET, FILM COATED ORAL at 08:45

## 2021-12-03 RX ADMIN — PREDNISONE 40 MG: 20 TABLET ORAL at 08:45

## 2021-12-03 RX ADMIN — ENOXAPARIN SODIUM 40 MG: 40 INJECTION SUBCUTANEOUS at 08:46

## 2021-12-03 RX ADMIN — ATORVASTATIN CALCIUM 10 MG: 10 TABLET, FILM COATED ORAL at 08:45

## 2021-12-03 RX ADMIN — ESCITALOPRAM OXALATE 20 MG: 20 TABLET ORAL at 08:45

## 2021-12-03 RX ADMIN — TAZOBACTAM SODIUM AND PIPERACILLIN SODIUM 4.5 G: 500; 4 INJECTION, SOLUTION INTRAVENOUS at 08:53

## 2021-12-03 RX ADMIN — TAZOBACTAM SODIUM AND PIPERACILLIN SODIUM 4.5 G: 500; 4 INJECTION, SOLUTION INTRAVENOUS at 00:11

## 2021-12-03 RX ADMIN — SODIUM CHLORIDE, PRESERVATIVE FREE 10 ML: 5 INJECTION INTRAVENOUS at 08:46

## 2021-12-03 RX ADMIN — DOXYCYCLINE 100 MG: 100 CAPSULE ORAL at 08:45

## 2021-12-03 RX ADMIN — ATENOLOL 100 MG: 50 TABLET ORAL at 08:46

## 2021-12-03 NOTE — DISCHARGE INSTR - APPOINTMENTS
You have an appointment with Schumer, Barry, MD  on December.   Call them if you have any questions. Phone: [phone] [address]  [facility] [address]  [facility] [address]

## 2021-12-03 NOTE — PLAN OF CARE
Goal Outcome Evaluation:  Plan of Care Reviewed With: patient        Progress: improving  Outcome Summary: Pt demonstrating ability to santosh and doff shoes with long handled shoe horn, modified Independent. OT educated pt on energy conservation, safety awareness and utilization of AE upon returning to prior living arrangements for maximum I and safety. Recommendation for HWA and HHS.

## 2021-12-03 NOTE — CASE MANAGEMENT/SOCIAL WORK
Case Management Discharge Note      Final Note: Per MDR, patient to discharge today.  Spoke to Meli with [facility] to advise patient to discharge today.  Spoke to Javier with [facility] who accepted referral and will have Oxygen delivered to patient room today.  Spoke with patient and wife at bedside regarding discharge plan and advised that both [facility] and [facility] have been contacted regarding discharge and an Oxygen tank will be delivered to the room today and they will contact them to have concentrator delivered to their home.  No other discharge needs verbalized.  Patient plan is to discharge home with [facility] today via car with family to transport.    Provided Post Acute Provider List?: N/A  N/A Provider List Comment: denies need    Selected Continued Care - Admitted Since 11/27/2021     Destination    No services have been selected for the patient.              Durable Medical Equipment Coordination complete.    Service Provider Selected Services Address Phone Fax Patient Preferred    [facility]  Durable [facility] Equipment [address], [facility] [address] [phone] --          Dialysis/Infusion    No services have been selected for the patient.              Home Medical Care Coordination complete.    Service Provider Selected Services Address Phone Fax Patient Preferred    [facility]  Home Health Services [address], [facility] [address] [phone] -- --          Therapy    No services have been selected for the patient.              Community Resources    No services have been selected for the patient.              Community & DME    No services have been selected for the patient.                       Final Discharge Disposition Code: 06 - home with home health care

## 2021-12-03 NOTE — THERAPY TREATMENT NOTE
Patient Name: Pablo Badillo  : 1940    MRN: [id number]                              Today's Date: 12/3/2021       Admit Date: 2021    Visit Dx:     ICD-10-CM ICD-9-CM   1. Acute respiratory failure with hypoxia (HCC)  J96.01 518.81   2. Pneumonia of right upper lobe due to infectious organism  J18.9 486   3. Hypoxia  R09.02 799.02   4. Peripheral polyneuropathy  G62.9 356.9     Patient Active Problem List   Diagnosis   • Hypoxia   • Peripheral neuropathy   • Essential hypertension   • Hyperlipidemia   • BPH (benign prostatic hyperplasia)     Past Medical History:   Diagnosis Date   • BCC (basal cell carcinoma of skin)    • BPH (benign prostatic hyperplasia)    • GERD (gastroesophageal reflux disease)    • Hyperlipidemia    • Hypertension    • IBS (irritable bowel syndrome)    • Idiopathic peripheral neuropathy    • Peptic ulcer    • Renal cyst     left   • Thrombocytopenia (HCC)      Past Surgical History:   Procedure Laterality Date   • AMPUTATION DIGIT      tip of left third digit   • CATARACT EXTRACTION     • HEMORRHOIDECTOMY     • HYDROCELE EXCISION / REPAIR        General Information     Row Name 21 1133          OT Time and Intention    Document Type therapy note (daily note)  -MR     Mode of Treatment occupational therapy  -MR     Row Name 21 1133          General Information    Patient Profile Reviewed yes  -MR     Existing Precautions/Restrictions oxygen therapy device and L/min  -MR     Row Name 21 1133          Cognition    Orientation Status (Cognition) oriented x 3  -MR     Row Name 21 1133          Safety Issues, Functional Mobility    Safety Issues Affecting Function (Mobility) sequencing abilities  -MR     Impairments Affecting Function (Mobility) endurance/activity tolerance; sensation/sensory awareness; strength; shortness of breath  -MR           User Key  (r) = Recorded By, (t) = Taken By, (c) = Cosigned By    Initials Name Provider Type    Hedy Lara,  OT Occupational Therapist                 Mobility/ADL's     Row Name 12/03/21 1135          Bed Mobility    Comment (Bed Mobility) Received [facility]  -MR     Row Name 12/03/21 1135          Activities of Daily Living    BADL Assessment/Intervention lower body dressing  -MR     Row Name 12/03/21 1135          Lower Body Dressing Assessment/Training    [place] Level (Lower Body Dressing) doff; don; shoes/slippers; modified independence  -MR     Assistive Devices (Lower Body Dressing) reacher; sock-aid  -MR     Position (Lower Body Dressing) unsupported sitting  -MR           User Key  (r) = Recorded By, (t) = Taken By, (c) = Cosigned By    Initials Name Provider Type    Hedy Lara, LUCIANO Occupational Therapist               Obj/Interventions     Row Name 12/03/21 1137          Balance    Balance Assessment sitting static balance; sitting dynamic balance  -MR     Static Sitting Balance WFL; unsupported; sitting in chair  -MR     Dynamic Sitting Balance WFL; sitting in chair; unsupported  -MR           User Key  (r) = Recorded By, (t) = Taken By, (c) = Cosigned By    Initials Name Provider Type    MR Hedy Nelson, LUCIANO Occupational Therapist               Goals/Plan    No documentation.                Clinical Impression     Row Name 12/03/21 1137          Pain Assessment    Additional Documentation Pain Scale: Numbers Pre/Post-Treatment (Group)  -MR     Row Name 12/03/21 1137          Pain Scale: Numbers Pre/Post-Treatment    Pretreatment Pain Rating 0/10 - no pain  -MR     Posttreatment Pain Rating 0/10 - no pain  -MR     Pre/Posttreatment Pain Comment Asymptomatic  -MR     Row Name 12/03/21 1137          Plan of Care Review    Plan of Care Reviewed With patient  -MR     Progress improving  -MR     Outcome Summary Pt demonstrating ability to santosh and doff shoes with long handled shoe horn, modified Independent. OT educated pt on energy conservation, safety awareness and utilization of AE upon returning to prior  living arrangements for maximum I and safety. Recommendation for HWA and HHS.  -MR     Row Name 12/03/21 1137          Therapy Plan Review/Discharge Plan (OT)    Anticipated Discharge Disposition (OT) home with assist; home with home health  -MR     Row Name 12/03/21 1137          Vital Signs    Pre Systolic BP Rehab 136  -MR     Pre Treatment Diastolic BP 72  -MR     Pretreatment Heart Rate (beats/min) 60  -MR     Pre SpO2 (%) 100  -MR     O2 Delivery Pre Treatment nasal cannula  -MR     Intra SpO2 (%) 92  -MR     O2 Delivery Intra Treatment nasal cannula  -MR     Post SpO2 (%) 98  -MR     O2 Delivery Post Treatment nasal cannula  -MR     Pre Patient Position Sitting  -MR     Intra Patient Position Sitting  -MR     Post Patient Position Sitting  -MR     Row Name 12/03/21 1137          Positioning and Restraints    Pre-Treatment Position sitting in chair/recliner  -MR     Post Treatment Position chair  -MR     In Chair notified nsg; sitting; call light within reach; encouraged to call for assist; exit alarm on; with family/caregiver; waffle cushion  -MR           User Key  (r) = Recorded By, (t) = Taken By, (c) = Cosigned By    Initials Name Provider Type    Hedy Lara, LUCIANO Occupational Therapist               Outcome Measures     Row Name 12/03/21 1141          How much help from another is currently needed...    Putting on and taking off regular lower body clothing? 3  -MR     Bathing (including washing, rinsing, and drying) 3  -MR     Toileting (which includes using toilet bed pan or urinal) 3  -MR     Putting on and taking off regular upper body clothing 3  -MR     Taking care of personal grooming (such as brushing teeth) 4  -MR     Eating meals 4  -MR     AM-PAC 6 Clicks Score (OT) 20  -MR           User Key  (r) = Recorded By, (t) = Taken By, (c) = Cosigned By    Initials Name Provider Type    Hedy Lara OT Occupational Therapist                Occupational Therapy Education                  Title: PT OT SLP Therapies (Done)     Topic: Occupational Therapy (Done)     Point: ADL training (Done)     Description:   Instruct learner(s) on proper safety adaptation and remediation techniques during self care or transfers.   Instruct in proper use of assistive devices.              Learning Progress Summary           Patient Acceptance, E,TB, VU,DU by MR at 12/3/2021 1141    Acceptance, E,D, VU,NR,DU by RAFAEL at 11/30/2021 1324    Comment: PLB, pacing self, activity increase, pulmonary and core TE, bed mobility technique and AD available, transfer safety    Acceptance, E,D, VU,NR by RAFAEL at 11/29/2021 0954    Comment: reason for consult, noted deficits, pulmonary TE, PLB, transfer technique, available AE to santosh socks   Family Acceptance, E,D, VU,NR,DU by RAFAEL at 11/30/2021 1324    Comment: PLB, pacing self, activity increase, pulmonary and core TE, bed mobility technique and AD available, transfer safety    Acceptance, E,D, VU,NR by RAFAEL at 11/29/2021 0954    Comment: reason for consult, noted deficits, pulmonary TE, PLB, transfer technique, available AE to santosh socks                   Point: Home exercise program (Done)     Description:   Instruct learner(s) on appropriate technique for monitoring, assisting and/or progressing therapeutic exercises/activities.              Learning Progress Summary           Patient Acceptance, E,TB, VU,DU by MR at 12/3/2021 1141    Acceptance, E,D, VU,NR,DU by RAFAEL at 11/30/2021 1324    Comment: PLB, pacing self, activity increase, pulmonary and core TE, bed mobility technique and AD available, transfer safety    Acceptance, E,D, VU,NR by RAFAEL at 11/29/2021 0954    Comment: reason for consult, noted deficits, pulmonary TE, PLB, transfer technique, available AE to santosh socks   Family Acceptance, E,D, VU,NR,DU by RAFAEL at 11/30/2021 1324    Comment: PLB, pacing self, activity increase, pulmonary and core TE, bed mobility technique and AD available, transfer safety    Acceptance, E,D, VU,NR  by RAFAEL at 11/29/2021 0954    Comment: reason for consult, noted deficits, pulmonary TE, PLB, transfer technique, available AE to santosh socks                   Point: Precautions (Done)     Description:   Instruct learner(s) on prescribed precautions during self-care and functional transfers.              Learning Progress Summary           Patient Acceptance, E,TB, VU,DU by MR at 12/3/2021 1141    Acceptance, E,D, VU,NR,DU by RAFAEL at 11/30/2021 1324    Comment: PLB, pacing self, activity increase, pulmonary and core TE, bed mobility technique and AD available, transfer safety    Acceptance, E,D, VU,NR by RAFAEL at 11/29/2021 0954    Comment: reason for consult, noted deficits, pulmonary TE, PLB, transfer technique, available AE to santosh socks   Family Acceptance, E,D, VU,NR,DU by RAFAEL at 11/30/2021 1324    Comment: PLB, pacing self, activity increase, pulmonary and core TE, bed mobility technique and AD available, transfer safety    Acceptance, E,D, VU,NR by RAFAEL at 11/29/2021 0954    Comment: reason for consult, noted deficits, pulmonary TE, PLB, transfer technique, available AE to santosh socks                   Point: Body mechanics (Done)     Description:   Instruct learner(s) on proper positioning and spine alignment during self-care, functional mobility activities and/or exercises.              Learning Progress Summary           Patient Acceptance, E,TB, VU,DU by MR at 12/3/2021 1141    Acceptance, E,D, VU,NR,DU by RAFAEL at 11/30/2021 1324    Comment: PLB, pacing self, activity increase, pulmonary and core TE, bed mobility technique and AD available, transfer safety    Acceptance, E,D, VU,NR by RAFAEL at 11/29/2021 0954    Comment: reason for consult, noted deficits, pulmonary TE, PLB, transfer technique, available AE to santosh socks   Family Acceptance, E,D, VU,NR,DU by RAFAEL at 11/30/2021 1324    Comment: PLB, pacing self, activity increase, pulmonary and core TE, bed mobility technique and AD available, transfer safety    Acceptance,  E,D, VU,NR by RAFAEL at 11/29/2021 0954    Comment: reason for consult, noted deficits, pulmonary TE, PLB, transfer technique, available AE to santosh socks                               User Key     Initials Effective Dates Name Provider Type Discipline    RAFAEL 06/16/21 -  Luann London OT Occupational Therapist OT    MR 10/06/21 -  Hedy Nelson, LUCIANO Occupational Therapist OT              OT Recommendation and Plan     Plan of Care Review  Plan of Care Reviewed With: patient  Progress: improving  Outcome Summary: Pt demonstrating ability to santosh and doff shoes with long handled shoe horn, modified Independent. OT educated pt on energy conservation, safety awareness and utilization of AE upon returning to prior living arrangements for maximum I and safety. Recommendation for [facility] and [facility].     Time Calculation:    Time Calculation- OT     Row Name 12/03/21 1142             Time Calculation- OT    OT Start Time 1113  -MR      OT Received On 12/03/21  -MR      OT Goal Re-Cert Due Date 12/09/21  -MR              Timed Charges    66519 - OT Self Care/Mgmt Minutes 17  -MR              Total Minutes    Timed Charges Total Minutes 17  -MR       Total Minutes 17  -MR            User Key  (r) = Recorded By, (t) = Taken By, (c) = Cosigned By    Initials Name Provider Type    MR Omar Hedy, OT Occupational Therapist              Therapy Charges for Today     Code Description Service Date Service Provider Modifiers Qty    [id number] HC OT SELF CARE/MGMT/TRAIN EA 15 MIN 12/3/2021 Hedy Nelson OT GO 1               HEDY NELSON OT  12/3/2021

## 2021-12-03 NOTE — PROGRESS NOTES
Pulmonary Hospital Follow-up     Hospital:  LOS: 6 days   Mr. Pablo Badillo, 81 y.o. male is followed for:   Pneumonia of right upper lobe due to infectious organism            History of present illness:   81-year-old male with history of hypertension, dyslipidemia, BPH, chronic thrombocytopenia, GERD, rheumatoid arthritis and peripheral neuropathy presented with few days history of dizziness and worsening shortness of breath.  Patient states that he has been told that he has sleep apnea 20 years ago and he was put on CPAP but he never could get used to it.  Lately is having problem with neuropathy and has been started on prednisone and gabapentin.  Patient states that he has known that his oxygen saturations dropped if he is not breathing deeply and lately he was noticing that he is getting more dizzy for about a week and his oxygen saturations are dropping into 80s so he presented to the emergency room.  He was found to be tachycardic with fever and also was hypoxic with lactic acidosis and leukocytosis.  Patient was started on antibiotics, oxygen therapy as well as gabapentin dose was decreased considering the cause of his dizziness.  CT angiogram was done which showed no evidence of pulmonary embolism.  Groundglass opacities identified in the right upper lung field.  There are some cystic changes noted in the upper lobes along with some groundglass opacities.  Patient states that he used to smoke 3 packs a day for 20 years but quit about 30 years ago.  Denies any other ongoing exposures. Does have some cough which is mainly dry not producing any sputum.  Today is the first day states that he produce some sputum.  Denies any sputum.  Denies any fevers, chills or night sweats.  Denies any wheezing or chest tightness.  His cultures are negative thus far.      Subjective   Interval History:  Patient states that he continues to do well.  He is down to 3 L nasal cannula and tolerating that well.  Sleeping well with  "CPAP.  Discussed the patient that we will probably need to set him up for outpatient sleep study and he is amenable to that.  Discussed about prednisone taper as well.             The patient's past medical, surgical and social history were reviewed and updated in Epic as appropriate.       Objective     Infusions:     Medications:  albuterol, 2.5 mg, Nebulization, Q6H - RT  atenolol, 100 mg, Oral, Q24H  atorvastatin, 10 mg, Oral, Daily  doxycycline, 100 mg, Oral, Q12H  enoxaparin, 40 mg, Subcutaneous, Daily  escitalopram, 20 mg, Oral, Daily  finasteride, 5 mg, Oral, Daily  gabapentin, 300 mg, Oral, Nightly  piperacillin-tazobactam, 4.5 g, Intravenous, Q8H  predniSONE, 40 mg, Oral, Daily  sodium chloride, 10 mL, Intravenous, Q12H  tamsulosin, 0.4 mg, Oral, Q24H        Vital Sign Min/Max for last 24 hours  Temp  Min: 97 °F (36.1 °C)  Max: 98 °F (36.7 °C)   BP  Min: 110/65  Max: 144/80   Pulse  Min: 45  Max: 85   Resp  Min: 14  Max: 20   SpO2  Min: 92 %  Max: 100 %   Flow (L/min)  Min: 3  Max: 3       Input/Output for last 24 hour shift  12/02 0701 - 12/03 0700  In: -   Out: 950 [Urine:950]    `  Objective:  Vital signs: (most recent): Blood pressure 110/65, pulse 68, temperature 97.5 °F (36.4 °C), temperature source Oral, resp. rate 18, height 177.8 cm (70\"), weight 104 kg (230 lb), SpO2 100 %.            General Appearance: Awake, alert, in no acute distress on nasal canula O2       Lungs:   B/L Breath sounds present with decreased breath sounds on bases, no wheezing heard, no crackles.   Heart: S1 and S2 present, no murmur  Extremities: Atraumatic, no cyanosis or clubbing,  no edema, warm to touch.  Neurologic:  Moving all four extremities. Good strength bilaterally.  Psychologic: Appropriate affect, Cooperative.       Results from last 7 days   Lab Units 12/03/21  1236 12/02/21  0540 12/01/21  0518   WBC 10*3/mm3 12.58* 10.98* 11.53*   HEMOGLOBIN g/dL 11.3* 11.6* 12.2*   PLATELETS 10*3/mm3 214 148 148     Results " from last 7 days   Lab Units 12/02/21  0540 12/01/21  1819 12/01/21  0518 11/30/21  1626 11/30/21  0524   SODIUM mmol/L 129*  --  127*  --  131*   POTASSIUM mmol/L 4.0 4.0 3.4*   < > 3.3*   CO2 mmol/L 29.0  --  29.0  --  25.0   BUN mg/dL 9  --  8  --  9   CREATININE mg/dL 0.53*  --  0.62*  --  0.59*   MAGNESIUM mg/dL 2.8*  --  1.8  --   --    GLUCOSE mg/dL 112*  --  82  --  84    < > = values in this interval not displayed.     Estimated Creatinine Clearance: 87.5 mL/min (A) (by C-G formula based on SCr of 0.53 mg/dL (L)).          Images:   No new Chest x-ray.    I reviewed the patient's results and images.     Assessment/Plan   Impression        Hypoxia    Peripheral neuropathy    Essential hypertension    Hyperlipidemia    BPH (benign prostatic hyperplasia)       Plan        1.  Patient presented with sepsis picture.  Found to have abnormal cluster of cystic and groundglass opacities in the right upper lobe area.  We are checking for hypersensitivity pneumonitis result which is still pending today.  Patient does have rheumatoid arthritis and sees rheumatology as outpatient.  Rheumatoid lung is another possibility with worsening inflammation.  Sed rate is borderline elevated.  PETER screen is negative.  Patient's baseline prednisone of 10 mg was increased to 40 mg in oxygen requirements improved over the next couple of days.  Is down to 3 L nasal cannula.  He is doing well currently without any ongoing symptoms of cough or sputum production.  Patient will need home oxygen.  Patient can be discharged home from pulmonary standpoint with the prednisone taper.  Discussed with Dr. Edmondson that we will wean prednisone by 10 mg every week until he comes down to his 10 mg a day baseline for his rheumatoid arthritis.  We will follow him in 5 to 6 weeks time and I will like to repeat imaging to take a better look at his lung parenchyma again.  2.  Diastolic dysfunction. However, seems euvolemic on exam.  3.  Patient finished of  antibiotics.  Medication for home antibiotics at this point.  4.  Will need outpatient sleep study.  Will place orders for outpatient and a polysomnogram as patient is requiring supplemental oxygen and will not be candidate for home sleep apnea testing.  Discussed with patient and his wife and they are agreeable.    Dariusz Ferro MD, [facility]  Pulmonary, Critical care and Sleep Medicine

## 2021-12-03 NOTE — DISCHARGE SUMMARY
[facility] Medicine Services  DISCHARGE SUMMARY    Patient Name: Pablo Badillo  : 1940  MRN: [id number]    Date of Admission: 2021  8:15 AM  Date of Discharge:  21  Primary Care Physician: Schumer, Barry, MD    Consults     Date and Time Order Name Status Description    2021  7:54 AM Inpatient Pulmonology Consult Completed           Hospital Course     Presenting Problem:   Acute respiratory failure with hypoxia (HCC) [J96.01]    Active Hospital Problems    Diagnosis  POA   • Hypoxia [R09.02]  Yes   • Peripheral neuropathy [G62.9]  Yes   • Essential hypertension [I10]  Yes   • Hyperlipidemia [E78.5]  Yes   • BPH (benign prostatic hyperplasia) [N40.0]  Yes      Resolved Hospital Problems    Diagnosis Date Resolved POA   • **Pneumonia of right upper lobe due to infectious organism [J18.9] 2021 Yes   • Sepsis, unspecified organism (HCC) [A41.9] 2021 Yes   • Hypokalemia [E87.6] 2021 Yes          Hospital Course:  Pablo Badillo is a 81 y.o. male with hx of HTN, HLD, BPH, chronic thrombocytopenia, GERD, arthritis, and peripheral neuropathy who presents due to several day history of worsening dizziness mostly in the mornings when he wakes up, post-nasal drip, dry cough, and shortness of breath. Found to have hypoxia with sepsis and RUL pneumonia.      Sepsis, POA (tachycardia, fever, hypoxia, lactic acidosis, leukocytosis)  Hypoxia  Pneumonia of right upper lobe vs interstitial edema  --Overall concerning for PNA rather than pulmonary edema/CHF given normal proBNP, no JVD or edema on exam. Echo with EF 55%, grade I diastolic dysfunction, trace aortic/mitral/tricuspid valve regurgitation.  --Continue Zosyn/Doxycycline. Negative MRSA PCR (received one time dose of Vanc in ER). Blood cultures NGTD. Negative Strep pneumo and Legionella urinary antigens. COVID and Flu PCR are negative--he is up to date on all vaccines. Switch to Augmentin for 2 more days at  discharge to complete 7 day course.  --Requiring up to 6 liters, mostly at night. Trial CPAP at night. Encourage incentive spirometry.   --Suspect underlying PARUL/OHS is contributing. He has not had a sleep study in 20 years--placed ambulatory referral in [facility] 11/30/21. Outpatient sleep study will be arranged.   --As he continued to have high oxygen requirements and is not on oxygen at baseline, consulted Pulmonology on 11/30/21. Discussed with Dr. Ferro. Have sent further autoimmune workup. PETER is negative. Remain concerned for possible rheumatoid lung given his changes on CT scan. Increased prednisone to 40 mg daily on 12/1/21 and plan to decrease by 10 mg once weekly until he is back to 10 mg daily. F/U with Pulmonology in 5-6 weeks with repeat CT chest.   --Discharge home on oxygen, currently requiring 3-4 liters. He tolerated CPAP well here, so will benefit from one as outpatient once he has a qualifying sleep study performed. He has a home pulse ox and I encouraged him to monitor it closely, maintain sats >88%. Return to the hospital if persistently hypoxic.     Dizziness, resolved  --Likely due to above--hypoxia, pneumonia.  --Also notes recent addition of gabapentin (per ANNETTE was started in June 2021), discontinuation of Lisinopril due to low BP, recent addition of prednisone taper.   --Decreased gabapentin, monitoring BP closely. Orthostatics negative.  --Echo without any significant valvular disease.     Hypokalemia  Hypomagnesemia  --Replaced per protocol with improvement.     Hyponatremia  --Has been somewhat chronic per patient.   --Stopped Chlorthalidone 12/1/21.  --Improved but remains low, continue to monitor as outpatient with PCP.     Chronic thrombocytopenia  --Followed by PCP, has been running low lately per patient. No prior labs for comparison.   --Stable thus far, continue to monitor.     HTN  HLD  --BP has been running low lately per patient, his Lisinopril was discontinued by his PCP  recently.   --Continue Atenolol with hold parameters. Chlorthalidone discontinued as above.  --Continue Lipitor.     Peripheral neuropathy  Rheumatoid arthritis  --Initially decreased gabapentin to 100 mg BID due to reported dizziness--he then spoke with his rheumatologist and they advised decreasing to 300 mg once daily (from 300 mg BID)--change made 11/29/21.  --His rheumatologist also recommended decreasing his prednisone to 5 mg daily (was on 10 mg)--change made 11/29/21. However we are increasing to 40 mg as above for possible rheumatoid lung, and plan to taper back down to 10 mg.  --PT/OT. Home health will be arranged at discharge.     BPH  --Continue Flomax, Proscar.      Discharge Follow Up Recommendations for outpatient labs/diagnostics:  F/U with PCP in 1 week  F/U for outpatient sleep study  F/U with Pulmonology in 5-6 weeks with repeat CT chest    Day of Discharge     HPI:   Patient seen this morning. Coughing up some clear phlegm. No complaints, feels a lot better.     Review of Systems  Gen-no fevers, no chills  CV-no chest pain, no palpitations  Resp-+cough, no dyspnea  GI-no N/V/D, no abd pain    All other systems reviewed and negative except any additional pertinent positives and negatives as discussed in HPI.      Vital Signs:   Temp:  [97 °F (36.1 °C)-98 °F (36.7 °C)] 97 °F (36.1 °C)  Heart Rate:  [45-85] 45  Resp:  [14-21] 18  BP: (114-144)/(54-80) 136/72     Physical Exam:  Gen-no acute distress  HENT-NCAT, mucous membranes moist  CV-RRR, S1 S2 normal, no m/r/g  Resp-diminished bilaterally, no wheezes or rales, nonlabored on 3 liters  Abd-soft, NT, ND, +BS  Ext-no edema  Neuro-A&Ox3, no focal deficits  Skin-no rashes  Psych-appropriate mood      Pertinent  and/or Most Recent Results     LAB RESULTS:      Lab 12/02/21  0540 12/01/21  0518 11/30/21  0524 11/29/21  0346 11/28/21  0657 11/27/21  1408 11/27/21  0837 11/27/21  0837   WBC 10.98* 11.53* 8.92 11.63* 11.48*  --    < > 12.22*   HEMOGLOBIN  11.6* 12.2* 11.7* 12.2* 12.3*  --    < > 13.1   HEMATOCRIT 36.0* 37.6 37.7 37.5 38.0  --    < > 41.4   PLATELETS 148 148 120* 138* 113*  --    < > 105*   NEUTROS ABS  --   --   --   --   --   --   --  9.76*   IMMATURE GRANS (ABS)  --   --   --   --   --   --   --  0.10*   LYMPHS ABS  --   --   --   --   --   --   --  0.64*   MONOS ABS  --   --   --   --   --   --   --  1.47*   EOS ABS  --   --   --   --   --   --   --  0.18   MCV 84.7 84.1 88.3 84.3 85.2  --    < > 86.6   SED RATE  --  35*  --   --   --   --   --   --    PROCALCITONIN  --   --   --   --   --   --   --  0.07   LACTATE  --   --   --   --   --  1.4  --  2.5*    < > = values in this interval not displayed.         Lab 12/02/21  0540 12/01/21  1819 12/01/21  0518 11/30/21  1626 11/30/21  0524 11/29/21  0346 11/29/21  0346 11/28/21  0658 11/28/21  0658   SODIUM 129*  --  127*  --  131*  --  132*  --  134*   POTASSIUM 4.0 4.0 3.4* 4.0 3.3*   < > 3.8  3.8   < > 3.5   CHLORIDE 93*  --  89*  --  95*  --  96*  --  96*   CO2 29.0  --  29.0  --  25.0  --  25.0  --  30.0*   ANION GAP 7.0  --  9.0  --  11.0  --  11.0  --  8.0   BUN 9  --  8  --  9  --  12  --  11   CREATININE 0.53*  --  0.62*  --  0.59*  --  0.65*  --  0.65*   GLUCOSE 112*  --  82  --  84  --  118*  --  98   CALCIUM 8.6  --  9.0  --  8.8  --  8.8  --  8.9   MAGNESIUM 2.8*  --  1.8  --   --   --   --   --   --     < > = values in this interval not displayed.         Lab 11/27/21  0837   TOTAL PROTEIN 6.6   ALBUMIN 3.90   GLOBULIN 2.7   ALT (SGPT) 24   AST (SGOT) 23   BILIRUBIN 0.7   ALK PHOS 52         Lab 11/29/21  0346 11/27/21  0837   PROBNP 100.7 402.5   TROPONIN T  --  <0.010                 Brief Urine Lab Results     None        Microbiology Results (last 10 days)     Procedure Component Value - Date/Time    Respiratory Culture - Sputum, Cough [[id number]] Collected: 11/30/21 0956    Lab Status: Final result Specimen: Sputum from Cough Updated: 12/02/21 1111     Respiratory Culture Moderate  growth (3+) Normal respiratory saritha. No S. aureus or Pseudomonas aeruginosa detected. Final report.     Gram Stain Moderate (3+) WBCs per low power field      Few (2+) Epithelial cells per low power field      No organisms seen    MRSA Screen, PCR (Inpatient) - Swab, Nares [[id number]]  (Normal) Collected: 11/27/21 1557    Lab Status: Final result Specimen: Swab from Nares Updated: 11/27/21 1736     MRSA PCR Negative    Narrative:      MRSA Negative    S. Pneumo Ag Urine or CSF - Urine, Urine, Clean Catch [[id number]]  (Normal) Collected: 11/27/21 1557    Lab Status: Final result Specimen: Urine, Clean Catch Updated: 11/27/21 2112     Strep Pneumo Ag Negative    Legionella Antigen, Urine - Urine, Urine, Clean Catch [[id number]]  (Normal) Collected: 11/27/21 1557    Lab Status: Final result Specimen: Urine, Clean Catch Updated: 11/27/21 2112     LEGIONELLA ANTIGEN, URINE Negative    Blood Culture - Blood, Arm, Left [[id number]]  (Normal) Collected: 11/27/21 0940    Lab Status: Final result Specimen: Blood from Arm, Left Updated: 12/02/21 1015     Blood Culture No growth at 5 days    Blood Culture - Blood, Arm, Left [[id number]]  (Normal) Collected: 11/27/21 0940    Lab Status: Final result Specimen: Blood from Arm, Left Updated: 12/02/21 1015     Blood Culture No growth at 5 days    COVID PRE-OP / PRE-PROCEDURE SCREENING ORDER (NO ISOLATION) - Swab, Nasopharynx [[id number]]  (Normal) Collected: 11/27/21 0940    Lab Status: Final result Specimen: Swab from Nasopharynx Updated: 11/27/21 1012    Narrative:      The following orders were created for panel order COVID PRE-OP / PRE-PROCEDURE SCREENING ORDER (NO ISOLATION) - Swab, Nasopharynx.  Procedure                               Abnormality         Status                     ---------                               -----------         ------                     COVID-19 and FLU A/B PCR...[[id number]]  Normal              Final result                 Please view results for  these tests on the individual orders.    COVID-19 and FLU A/B PCR - Swab, Nasopharynx [[id number]]  (Normal) Collected: 11/27/21 0940    Lab Status: Final result Specimen: Swab from Nasopharynx Updated: 11/27/21 1012     COVID19 Not Detected     Influenza A PCR Not Detected     Influenza B PCR Not Detected    Narrative:      Fact sheet for providers: https://www.fda.gov/media/158929/download    Fact sheet for patients: https://www.fda.gov/media/551799/download    Test performed by PCR.          Adult Transthoracic Echo Complete W/ Cont if Necessary Per Protocol    Result Date: 11/28/2021  · Estimated left ventricular EF = 55% Left ventricular systolic function is normal. · Left ventricular diastolic function is consistent with (grade I) impaired relaxation. · Trace aortic valve regurgitation is present. · Trace mitral valve regurgitation is present · Trace to mild tricuspid valve regurgitation is present      XR Chest 1 View    Result Date: 12/1/2021  EXAMINATION: XR CHEST 1 VW- 11/29/2021  INDICATION: F/U PNA; J96.01-Acute respiratory failure with hypoxia; J18.9-Pneumonia, unspecified organism  COMPARISON: 11/27/2021  FINDINGS: Portable chest reveals heart to be borderline enlarged. Increased pulmonary vascularity bilaterally with degenerative changes seen within the spine. No definite pleural effusion or pneumothorax. Stable increased markings identified within the right upper lobe. No new focal parenchymal opacification present.        Prominence of the pulmonary vascularity. Stable increased markings in the right upper lobe. Degenerative changes seen within the spine. The heart is borderline enlarged. Findings are stable.  D: 11/29/2021 E: 11/29/2021  This report was finalized on 12/1/2021 4:50 PM by Dr. Ila Hammonds MD.      XR Chest 1 View    Result Date: 11/27/2021  EXAMINATION: XR CHEST 1 VW-  INDICATION: SOA triage protocol  TECHNIQUE: Frontal view of the chest  COMPARISON: NONE  FINDINGS:  Borderline  enlargement of the heart. No focal consolidation. Diffuse interstitial prominence. No pleural effusion or pneumothorax.       Borderline enlargement of the heart with diffuse interstitial prominence which may reflect a component of interstitial edema. No focal consolidation.  This report was finalized on 11/27/2021 9:09 AM by Valentin Gray MD.      CT Angiogram Chest    Result Date: 11/29/2021  EXAMINATION: CT ANGIOGRAM CHEST- 11/27/2021  INDICATION: PE protocol; J96.01-Acute respiratory failure with hypoxia; shortness of breath and chest pain  TECHNIQUE: Multiple axial CT imaging was obtained of the chest following the administration of intravenous contrast according to the CT angio protocol. 2-D coronal reformatted images were submitted to further facilitate diagnostic accuracy and treatment planning.  The radiation dose reduction device was turned on for each scan per the ALARA (As Low as Reasonably Achievable) protocol.  COMPARISON: NONE  FINDINGS: Thyroid is homogeneous in appearance. No mediastinal mass or adenopathy. The cardiac chambers are within normal limits. No pericardial effusion. No bulky hilar or axillary adenopathy. There are small lymph nodes scattered throughout the axillary regions bilaterally and mediastinum however no evidence of significant enlargement to suggest or categorize this adenopathy. Findings may be reactive. The lung parenchyma reveals no parenchymal consolidation, no pulmonary mass or pleural effusion. There is atelectatic changes seen at lung bases bilaterally. There is a 5 mm noncalcified nodule identified within the right middle lobe. Some groundglass opacity identified posteriorly within the right upper lung field. Findings may suggest mild edema versus early infiltrate. Clinical correlation is needed.      Some groundglass opacity identified suggesting interstitial edema or infiltrate in the right upper lung field. Atelectatic changes seen at the lung bases. No evidence of  pulmonary embolism. Small scattered lymph nodes seen throughout the mediastinum with no adenopathy present. Small lymph nodes as well seen in the axillary regions bilaterally with no evidence of adenopathy.  D: 11/27/2021 E: 11/29/2021  This report was finalized on 11/29/2021 3:51 PM by Dr. Ila Hammonds MD.                Results for orders placed during the hospital encounter of 11/27/21    Adult Transthoracic Echo Complete W/ Cont if Necessary Per Protocol    Interpretation Summary  · Estimated left ventricular EF = 55% Left ventricular systolic function is normal.  · Left ventricular diastolic function is consistent with (grade I) impaired relaxation.  · Trace aortic valve regurgitation is present.  · Trace mitral valve regurgitation is present  · Trace to mild tricuspid valve regurgitation is present    Pending Labs     Order Current Status    Hypersensitivity Pneumonitis Profile In process    IgE In process        Discharge Details        Discharge Medications      New Medications      Instructions Start Date   amoxicillin-clavulanate 875-125 MG per tablet  Commonly known as: Augmentin   1 tablet, Oral, 2 Times Daily      atenolol 100 MG tablet  Commonly known as: TENORMIN   100 mg, Oral, Every 24 Hours Scheduled   Start Date: December 4, 2021        Changes to Medications      Instructions Start Date   gabapentin 300 MG capsule  Commonly known as: NEURONTIN  What changed: when to take this   300 mg, Oral, Nightly      predniSONE 20 MG tablet  Commonly known as: DELTASONE  What changed:   · medication strength  · See the new instructions.  · These instructions start on December 5, 2021. If you are unsure what to do until then, ask your doctor or other care provider.   Take 2 tablets by mouth Daily for 5 days, THEN 1.5 tablets Daily for 7 days, THEN 1 tablet Daily for 7 days, THEN 0.5 tablets Daily for 7 days.   Start Date: December 5, 2021        Continue These Medications      Instructions Start Date    atorvastatin 10 MG tablet  Commonly known as: LIPITOR   10 mg, Oral, Daily      escitalopram 20 MG tablet  Commonly known as: LEXAPRO   20 mg, Oral, Daily      finasteride 5 MG tablet  Commonly known as: PROSCAR   5 mg, Oral, Daily      tamsulosin 0.4 MG capsule 24 hr capsule  Commonly known as: FLOMAX   1 capsule, Oral, Daily         Stop These Medications    atenolol-chlorthalidone 100-25 MG per tablet  Commonly known as: TENORETIC            No Known Allergies      Discharge Disposition:  Home or Self Care    Diet:  Hospital:  Diet Order   Procedures   • Diet Regular       Activity:  Activity Instructions     Activity as Tolerated                 CODE STATUS:    Code Status and Medical Interventions:   Ordered at: 11/27/21 1448     Code Status (Patient has no pulse and is not breathing):    CPR (Attempt to Resuscitate)     Medical Interventions (Patient has pulse or is breathing):    Full Support       No future appointments.    Additional Instructions for the Follow-ups that You Need to Schedule     Ambulatory Referral to Home Health   As directed      Face to Face Visit Date: 12/2/2021    Follow-up provider for Plan of Care?: I treated the patient in an acute care facility and will not continue treatment after discharge.    Follow-up provider: SCHUMER, BARRY [[id number]]    Reason/Clinical Findings: pneumonia of right upper lobe due to infectious organism    Describe mobility limitations that make leaving home difficult: weakness, SOA, impaired physical mobility, impaired functional mobility    Nursing/Therapeutic Services Requested: Skilled Nursing Physical Therapy Occupational Therapy    Skilled nursing orders: Medication education O2 instruction Cardiopulmonary assessments    PT orders: Therapeutic exercise Gait Training Transfer training Strengthening Home safety assessment    Weight Bearing Status: As Tolerated    Occupational orders: Activities of daily living Energy conservation Strengthening Home safety  assessment         Ambulatory Referral to Sleep Medicine   As directed      Discharge Follow-up with PCP   As directed       Currently Documented PCP:    Schumer, Barry, MD    PCP Phone Number:    [phone]     Follow Up Details: 1 week         Discharge Follow-up with Specified Provider: Pulmonology in 5-6 weeks with repeat CT chest; seen by Dr. Ferro during hospital admission   As directed      To: Pulmonology in 5-6 weeks with repeat CT chest; seen by Dr. Ferro during hospital admission                     Rebeka Edmondson MD  12/03/21      Time Spent on Discharge:  I spent 40  minutes on this discharge activity which included: face-to-face encounter with the patient, reviewing the data in the system, coordination of the care with the nursing staff as well as consultants, documentation, and entering orders.

## 2021-12-04 LAB — IGE SERPL-ACNC: 3 IU/ML (ref 6–495)

## 2021-12-06 LAB
A FUMIGATUS IGG SER QL: NEGATIVE
A PULLULANS AB SER QL: NEGATIVE
LACEYELLA SACCHARI AB SER QL: NEGATIVE
PIGEON SERUM AB QL ID: NEGATIVE
S RECTIVIRGULA AB SER QL ID: NEGATIVE
T VULGARIS AB SER QL ID: NEGATIVE

## 2021-12-22 DIAGNOSIS — J18.9 PNEUMONIA DUE TO INFECTIOUS ORGANISM, UNSPECIFIED LATERALITY, UNSPECIFIED PART OF LUNG: Primary | ICD-10-CM

## 2022-01-02 ENCOUNTER — HOSPITAL ENCOUNTER (OUTPATIENT)
Dept: SLEEP MEDICINE | Facility: HOSPITAL | Age: 82
Discharge: HOME | End: 2022-01-02
Admitting: INTERNAL MEDICINE

## 2022-01-02 VITALS — OXYGEN SATURATION: 89 % | BODY MASS INDEX: 32.82 KG/M2 | HEART RATE: 70 BPM | WEIGHT: 229.28 LBS | HEIGHT: 70 IN

## 2022-01-02 PROCEDURE — 95811 POLYSOM 6/>YRS CPAP 4/> PARM: CPT | Performed by: INTERNAL MEDICINE

## 2022-01-02 PROCEDURE — 95811 POLYSOM 6/>YRS CPAP 4/> PARM: CPT

## 2022-01-05 PROBLEM — G47.33 SEVERE OBSTRUCTIVE SLEEP APNEA: Status: RESOLVED | Noted: 2022-01-05 | Resolved: 2022-01-05

## 2022-01-05 PROBLEM — G47.33 SEVERE OBSTRUCTIVE SLEEP APNEA: Status: ACTIVE | Noted: 2022-01-05

## 2022-01-05 PROBLEM — R09.02 HYPOXIA: Status: RESOLVED | Noted: 2021-11-27 | Resolved: 2022-01-05

## 2022-01-06 ENCOUNTER — OFFICE VISIT (OUTPATIENT)
Dept: SLEEP MEDICINE | Facility: HOSPITAL | Age: 82
End: 2022-01-06

## 2022-01-06 VITALS
DIASTOLIC BLOOD PRESSURE: 43 MMHG | HEIGHT: 70 IN | HEART RATE: 54 BPM | OXYGEN SATURATION: 96 % | BODY MASS INDEX: 32.9 KG/M2 | SYSTOLIC BLOOD PRESSURE: 110 MMHG

## 2022-01-06 DIAGNOSIS — E66.9 OBESITY (BMI 30-39.9): ICD-10-CM

## 2022-01-06 DIAGNOSIS — R93.89 ABNORMAL CHEST CT: ICD-10-CM

## 2022-01-06 DIAGNOSIS — G47.33 OSA (OBSTRUCTIVE SLEEP APNEA): Primary | ICD-10-CM

## 2022-01-06 DIAGNOSIS — G47.34 SLEEP RELATED HYPOXIA: ICD-10-CM

## 2022-01-06 PROCEDURE — 99214 OFFICE O/P EST MOD 30 MIN: CPT | Performed by: INTERNAL MEDICINE

## 2022-01-06 RX ORDER — ATENOLOL AND CHLORTHALIDONE TABLET 100; 25 MG/1; MG/1
TABLET ORAL
COMMUNITY
End: 2022-01-19 | Stop reason: ALTCHOICE

## 2022-01-06 RX ORDER — POTASSIUM CHLORIDE 1500 MG/1
TABLET, FILM COATED, EXTENDED RELEASE ORAL
COMMUNITY
Start: 2021-12-27

## 2022-01-06 NOTE — PROGRESS NOTES
"     Follow Up Office Visit      Patient Name: Pablo Badillo    Chief Complaint:    Chief Complaint   Patient presents with   • Sleeping Problem       History of Present Illness: Pablo Badillo is a 81 y.o. male who is here today for follow up after sleep study    81-year-old male with past medical history of  hypertension, dyslipidemia, RA, BPH, chronic thrombocytopenia, GERD, arthritis and peripheral neuropathy, remote history of smoking. Pt was recently admitted with worsening hypoxemic resp failure and was discharged on home O2. Pt was set up for outpatient sleep study as concern for sleep disordered breathing was high. Pt comes today for follow up and states that since discharge he has been doing well. He is down to 3 L NC. He underwent split sleep study and was found to have severe PARUL and sleep related hypoxia and was treated with CPAP and 1 LPM O2. He tolerated BPAP at hospital and he tolerated CPAP during sleep study as well. Results of sleep study reviewed in detail with patient.     [place] score is 14/24.    Subjective      Review of Systems:   Review of Systems   Constitutional: Positive for fatigue.   HENT: Negative.    Respiratory: Positive for shortness of breath.    Cardiovascular: Negative.    Gastrointestinal: Negative.    Endocrine: Negative.    Musculoskeletal: Positive for arthralgias.   Skin: Negative.    Neurological: Negative.    Hematological: Negative.    Psychiatric/Behavioral: Positive for sleep disturbance.   All other systems reviewed and are negative.      The following portions of the patient's history were reviewed and updated as appropriate: allergies, current medications, past family history, past medical history, past social history, past surgical history and problem list.    Objective     Physical Exam:  Vital Signs:   Vitals:    01/06/22 1016   BP: 110/43   Pulse: 54   SpO2: 96%  Comment: 3L O2   Weight: Comment: WHEELCHAIR   Height: 177.8 cm (70\")     Body mass index is 32.9 " kg/m².    Physical Exam  Vitals and nursing note reviewed.   Constitutional:       General: He is not in acute distress.     Appearance: He is well-developed. He is not diaphoretic.   HENT:      Head: Normocephalic and atraumatic.      Comments: Mallampati 3 airway     Nose: Nose normal.      Mouth/Throat:      Pharynx: No oropharyngeal exudate.   Eyes:      General:         Right eye: No discharge.         Left eye: No discharge.      Pupils: Pupils are equal, round, and reactive to light.   Neck:      Thyroid: No thyromegaly.      Trachea: No tracheal deviation.   Cardiovascular:      Rate and Rhythm: Normal rate and regular rhythm.      Heart sounds: Normal heart sounds. No murmur heard.  No friction rub. No gallop.    Pulmonary:      Effort: Pulmonary effort is normal. No respiratory distress.      Breath sounds: Normal breath sounds. No wheezing.   Musculoskeletal:         General: No tenderness.      Cervical back: Neck supple.      Right lower leg: No edema.      Left lower leg: No edema.      Comments: Clubbing: none   Lymphadenopathy:      Cervical: No cervical adenopathy.   Neurological:      Mental Status: He is alert and oriented to person, place, and time.      Cranial Nerves: No cranial nerve deficit.      Coordination: Coordination normal.   Psychiatric:         Behavior: Behavior normal.         Thought Content: Thought content normal.         Judgment: Judgment normal.         Results Review:   Patient's overnight polysomnogram reviewed and showed severe obstructive sleep apnea with AHI 74.5.  Patient was treated with CPAP pressure of 14 cmH2O and also required 1 L/min oxygen supplementation with the CPAP.    Assessment / Plan      Assessment:   Problem List Items Addressed This Visit     None      Visit Diagnoses     PARUL (obstructive sleep apnea)    -  Primary    Relevant Orders    CPAP Therapy    Sleep related hypoxia        Obesity (BMI 30-39.9)        Abnormal chest CT              Plan:   1. Pt  with severe PARUL and sleep related hypoxia. Results reviewed in detail and treatment plan discussed. Pt is agreeable to proceed with CPAP therapy. Will place orders for CPAP 12-20 cwp and follow closely. Pt is agreeable.   2. Increasing activity and weight loss counseled and its impact on sleep apnea reviewed with the patient.   3 Pt will follow up in pulmonary clinic for abnormal chest CT and upper lobe predominant pulm fibrosis changes. Will need PFTs and also 6 minute walk test.   4. Pt is followed by rheumatology as well and now off prednisone.     Will follow patient closely on CPAP therapy.     Follow Up:   2-3 months with CPAP download    Discussed plan of care in detail with patient today. Patient verbally understands and agrees.I spent 35 minutes on this date of service. This time includes time spent by me in the following activities:preparing for the visit, reviewing tests, obtaining and/or reviewing a separately obtained history, performing a medically appropriate examination, counseling the patient/family, ordering CPAP and supplies, and/or documenting information in the medical record.       Dariusz Ferro MD  Pulmonary Critical Care and Sleep Medicine    Please note that portions of this note were completed with a voice recognition program.

## 2022-01-10 ENCOUNTER — HOSPITAL ENCOUNTER (OUTPATIENT)
Dept: CT IMAGING | Facility: HOSPITAL | Age: 82
Discharge: HOME | End: 2022-01-10
Admitting: NURSE PRACTITIONER

## 2022-01-10 DIAGNOSIS — J18.9 PNEUMONIA DUE TO INFECTIOUS ORGANISM, UNSPECIFIED LATERALITY, UNSPECIFIED PART OF LUNG: ICD-10-CM

## 2022-01-10 PROCEDURE — 71250 CT THORAX DX C-: CPT

## 2022-01-19 ENCOUNTER — OFFICE VISIT (OUTPATIENT)
Dept: PULMONOLOGY | Facility: CLINIC | Age: 82
End: 2022-01-19

## 2022-01-19 VITALS
HEIGHT: 70 IN | HEART RATE: 79 BPM | BODY MASS INDEX: 33.28 KG/M2 | TEMPERATURE: 98 F | RESPIRATION RATE: 16 BRPM | SYSTOLIC BLOOD PRESSURE: 124 MMHG | OXYGEN SATURATION: 97 % | WEIGHT: 232.5 LBS | DIASTOLIC BLOOD PRESSURE: 64 MMHG

## 2022-01-19 DIAGNOSIS — J30.9 ALLERGIC RHINITIS, UNSPECIFIED SEASONALITY, UNSPECIFIED TRIGGER: ICD-10-CM

## 2022-01-19 DIAGNOSIS — G47.33 OSA (OBSTRUCTIVE SLEEP APNEA): ICD-10-CM

## 2022-01-19 DIAGNOSIS — R53.83 FATIGUE, UNSPECIFIED TYPE: Primary | ICD-10-CM

## 2022-01-19 DIAGNOSIS — M06.9 RHEUMATOID ARTHRITIS INVOLVING MULTIPLE SITES, UNSPECIFIED WHETHER RHEUMATOID FACTOR PRESENT: ICD-10-CM

## 2022-01-19 DIAGNOSIS — J84.9 ILD (INTERSTITIAL LUNG DISEASE): ICD-10-CM

## 2022-01-19 DIAGNOSIS — J30.89 OTHER ALLERGIC RHINITIS: ICD-10-CM

## 2022-01-19 DIAGNOSIS — R06.02 SHORTNESS OF BREATH: ICD-10-CM

## 2022-01-19 LAB — CRP SERPL-MCNC: 1.8 MG/DL (ref 0–0.5)

## 2022-01-19 PROCEDURE — 86037 ANCA TITER EACH ANTIBODY: CPT | Performed by: NURSE PRACTITIONER

## 2022-01-19 PROCEDURE — 86003 ALLG SPEC IGE CRUDE XTRC EA: CPT | Performed by: NURSE PRACTITIONER

## 2022-01-19 PROCEDURE — 86606 ASPERGILLUS ANTIBODY: CPT | Performed by: NURSE PRACTITIONER

## 2022-01-19 PROCEDURE — 36415 COLL VENOUS BLD VENIPUNCTURE: CPT | Performed by: NURSE PRACTITIONER

## 2022-01-19 PROCEDURE — 83520 IMMUNOASSAY QUANT NOS NONAB: CPT | Performed by: NURSE PRACTITIONER

## 2022-01-19 PROCEDURE — 86612 BLASTOMYCES ANTIBODY: CPT | Performed by: NURSE PRACTITIONER

## 2022-01-19 PROCEDURE — 82104 ALPHA-1-ANTITRYPSIN PHENO: CPT | Performed by: NURSE PRACTITIONER

## 2022-01-19 PROCEDURE — 82103 ALPHA-1-ANTITRYPSIN TOTAL: CPT | Performed by: NURSE PRACTITIONER

## 2022-01-19 PROCEDURE — 86480 TB TEST CELL IMMUN MEASURE: CPT | Performed by: NURSE PRACTITIONER

## 2022-01-19 PROCEDURE — 99215 OFFICE O/P EST HI 40 MIN: CPT | Performed by: NURSE PRACTITIONER

## 2022-01-19 PROCEDURE — 86140 C-REACTIVE PROTEIN: CPT | Performed by: NURSE PRACTITIONER

## 2022-01-19 PROCEDURE — 82164 ANGIOTENSIN I ENZYME TEST: CPT | Performed by: NURSE PRACTITIONER

## 2022-01-19 PROCEDURE — 86738 MYCOPLASMA ANTIBODY: CPT | Performed by: NURSE PRACTITIONER

## 2022-01-19 RX ORDER — HYDROCHLOROTHIAZIDE 25 MG/1
25 TABLET ORAL DAILY
COMMUNITY
Start: 2022-01-11 | End: 2022-02-16

## 2022-01-19 RX ORDER — ATENOLOL 25 MG/1
25 TABLET ORAL DAILY
COMMUNITY
Start: 2022-01-10 | End: 2022-02-16

## 2022-01-20 PROBLEM — M06.9 RHEUMATOID ARTHRITIS INVOLVING MULTIPLE SITES: Status: ACTIVE | Noted: 2022-01-20

## 2022-01-20 PROBLEM — J84.9 ILD (INTERSTITIAL LUNG DISEASE): Status: ACTIVE | Noted: 2022-01-20

## 2022-01-20 PROBLEM — R06.02 SHORTNESS OF BREATH: Status: ACTIVE | Noted: 2022-01-20

## 2022-01-20 NOTE — PROGRESS NOTES
[facility] Pulmonary Follow up    CHIEF COMPLAINT    Dyspnea    HISTORY OF PRESENT ILLNESS    Pablo Badillo is a 81 y.o.male here today for a hospital follow-up.  He was hospitalized at [facility] on 11/27 to 12/3 for acute respiratory failure with hypoxia.  He was tachycardic and hypoxic and started on oxygen.  He had a CTA of the chest that showed groundglass opacity suggesting interstitial edema or infiltrate in the right upper lung.  He was treated with antibiotics and a prednisone taper.  If symptoms initially improved and he was discharged home on oxygen.  He was also sent home with a prednisone taper.  He has completed his prednisone has been on this for about 2 weeks.    States about 1 week prior to his hospitalization he was having increased dizziness and shortness of breath worse with exertion.  He has never been on oxygen prior to hospitalization.    Is currently using 3 L continuous during the day and 4 L at night.  He continues to have shortness of breath mild with exertion.  He has not been improvement in his breathing since his hospitalization.    He denies any recurrent infections as a child.  He denies any diagnosis of asthma in the past.  He denies exposure to TB.  He denies any chemical or environmental exposures in the past.  He denies any history of lung cancer in first-degree relatives.  He worked with the [place] [employer] and is retired.    He did quit smoking in 1971 and had a 45-pack-year history.    He does have a history of PARUL, and has never been able to tolerate CPAP. He had a sleep study that showed severe obstructive sleep apnea.  He is in the process of getting set up with a new CPAP machine.  When he recieves his CPAP, he was told to decrease his oxygen to 1 L bled through his CPAP machine.    He is trying to stay active at home and is working with PT and ambulating at home.    He had a repeat CT scan performed last week and is here today for the results.      He  denies fever, chills infection, hemoptysis, night sweats, weight loss, chest pain or palpitations.  He has noted more lower extremity edema over the last several weeks.  He had an echocardiogram while hospitalized with the results below.  He denies any current allergy symptoms.  He denies reflux.    He is up to date on his current vaccinations.  Patient Active Problem List   Diagnosis   • Peripheral neuropathy   • Essential hypertension   • Hyperlipidemia   • BPH (benign prostatic hyperplasia)   • PARUL (obstructive sleep apnea)   • ERRONEOUS ENCOUNTER--DISREGARD   • Shortness of breath   • ILD (interstitial lung disease) ([facility])   • Rheumatoid arthritis involving multiple sites ([facility])       No Known Allergies    Current Outpatient Medications:   •  atenolol (TENORMIN) 25 MG tablet, Take 25 mg by mouth Daily., Disp: , Rfl:   •  atorvastatin (LIPITOR) 10 MG tablet, Take 10 mg by mouth Daily., Disp: , Rfl:   •  escitalopram (LEXAPRO) 20 MG tablet, Take 20 mg by mouth Daily., Disp: , Rfl:   •  finasteride (PROSCAR) 5 MG tablet, Take 5 mg by mouth Daily., Disp: , Rfl:   •  gabapentin (NEURONTIN) 300 MG capsule, Take 1 capsule by mouth Every Night., Disp: , Rfl:   •  hydroCHLOROthiazide (HYDRODIURIL) 25 MG tablet, Take 25 mg by mouth Daily., Disp: , Rfl:   •  potassium chloride ER (K-TAB) 20 MEQ tablet controlled-release ER tablet, , Disp: , Rfl:   •  tamsulosin (FLOMAX) 0.4 MG capsule 24 hr capsule, Take 1 capsule by mouth Daily., Disp: , Rfl:   MEDICATION LIST AND ALLERGIES REVIEWED.    Social History     Tobacco Use   • Smoking status: Former Smoker     Packs/day: 3.00     Years: 15.00     Pack years: 45.00     Types: Cigarettes     Start date: 1956     Quit date:      Years since quittin.5   • Smokeless tobacco: Never Used   Substance Use Topics   • Alcohol use: Not Currently     Alcohol/week: 0.0 standard drinks   • Drug use: Never       FAMILY AND SOCIAL HISTORY REVIEWED.    Review of Systems  "  Constitutional: Positive for fatigue. Negative for activity change, appetite change, fever and unexpected weight change.   HENT: Negative for congestion, postnasal drip, rhinorrhea, sinus pressure, sore throat and voice change.    Eyes: Negative for visual disturbance.   Respiratory: Positive for shortness of breath. Negative for cough, chest tightness and wheezing.    Cardiovascular: Negative for chest pain, palpitations and leg swelling.   Gastrointestinal: Negative for abdominal distention, abdominal pain, nausea and vomiting.   Endocrine: Negative for cold intolerance and heat intolerance.   Genitourinary: Negative for difficulty urinating and urgency.   Musculoskeletal: Negative for arthralgias, back pain and neck pain.   Skin: Negative for color change and pallor.   Allergic/Immunologic: Negative for environmental allergies and food allergies.   Neurological: Negative for dizziness, syncope, weakness and light-headedness.   Hematological: Negative for adenopathy. Does not bruise/bleed easily.   Psychiatric/Behavioral: Negative for agitation and behavioral problems.   .    /64 (BP Location: Left arm, Patient Position: Sitting, Cuff Size: Adult)   Pulse 79   Temp 98 °F (36.7 °C)   Resp 16   Ht 177.8 cm (70\")   Wt 105 kg (232 lb 8 oz)   SpO2 97%   PF (!) 3 L/min Comment: Pulse at resting  BMI 33.36 kg/m²     Immunization History   Administered Date(s) Administered   • COVID-19 (PFIZER) PURPLE CAP 12/29/2020, 01/18/2021, 09/29/2021       Physical Exam  Vitals and nursing note reviewed.   Constitutional:       Appearance: He is well-developed. He is not diaphoretic.   HENT:      Head: Normocephalic and atraumatic.   Eyes:      Pupils: Pupils are equal, round, and reactive to light.   Neck:      Thyroid: No thyromegaly.   Cardiovascular:      Rate and Rhythm: Normal rate and regular rhythm.      Heart sounds: Normal heart sounds. No murmur heard.  No friction rub. No gallop.    Pulmonary:      " Effort: Pulmonary effort is normal. No respiratory distress.      Breath sounds: Normal breath sounds. No wheezing or rales.   Chest:      Chest wall: No tenderness.   Abdominal:      General: Bowel sounds are normal.      Palpations: Abdomen is soft.      Tenderness: There is no abdominal tenderness.   Musculoskeletal:         General: Swelling present. Normal range of motion.      Cervical back: Normal range of motion and neck supple.   Lymphadenopathy:      Cervical: No cervical adenopathy.   Skin:     General: Skin is warm and dry.      Capillary Refill: Capillary refill takes less than 2 seconds.   Neurological:      Mental Status: He is alert and oriented to person, place, and time.   Psychiatric:         Mood and Affect: Mood normal.         Behavior: Behavior normal.           RESULTS  CT Chest Without Contrast Diagnostic    Result Date: 1/11/2022  Persistent areas of fairly contiguous peripheral predominant groundglass opacity, more notable on the right than on the left. Appearance could represent persistent/evolving pneumonia in the correct clinical setting. In the absence of prior comparison exams, interstitial lung disease could have a similar appearance.  Grossly unchanged, conspicuous in number but not specifically enlarged upper mediastinal and bilateral axillary lymphadenopathy.   This report was finalized on 1/11/2022 10:38 AM by Bruce Avalos.      CT Angiogram Chest    Result Date: 11/29/2021  Some groundglass opacity identified suggesting interstitial edema or infiltrate in the right upper lung field. Atelectatic changes seen at the lung bases. No evidence of pulmonary embolism. Small scattered lymph nodes seen throughout the mediastinum with no adenopathy present. Small lymph nodes as well seen in the axillary regions bilaterally with no evidence of adenopathy.  D: 11/27/2021 E: 11/29/2021  This report was finalized on 11/29/2021 3:51 PM by Dr. Ila Hammonds MD.      PROBLEM  LIST    Problem List Items Addressed This Visit        Musculoskeletal and Injuries    Rheumatoid arthritis involving multiple sites ([facility])       Pulmonary and Pneumonias    Shortness of breath    ILD (interstitial lung disease) ([facility])       Sleep    PARUL (obstructive sleep apnea)      Other Visit Diagnoses     Fatigue, unspecified type    -  Primary    Relevant Orders    C-reactive Protein (Completed)    ANCA Panel    Angiotensin Converting Enzyme    Aspergillus Antibodies    Aspergillus Fumigatus IgE    Mycoplasma Pneu. IgG / IgM Antibodies    QuantiFERON TB Gold    Allergens, Zone 8    Alpha - 1 - Antitrypsin    Alpha - 1 - Antitrypsin Phenotype    Fungal Antibodies, Quantitative Double Immunodiffusion    Other allergic rhinitis         Relevant Orders    Aspergillus Fumigatus IgE    Allergic rhinitis, unspecified seasonality, unspecified trigger         Relevant Orders    Allergens, Zone 8            DISCUSSION    Mr. Badillo was here for a hospital follow up.  He seems to be slowly improving from a pulmonary standpoint.  He stills continues to get hypoxic with exertion.  He was 86% on room air at rest after being off oxygen.  I did encourage him to use his oxygen with any exertion from 2-3L PD.  He can take breaks at rest without oxygen but I advised him to keep his o2 saturations above 88%.    We did review his scan and it shows slight improvement.  We will continue to monitor closely.  He may benefit from a HRCT in the near future.      Hopefully he will be set up with his CPAP soon with 1 L at night.  I did advise him to use 3L at night for now until he recieves his CPAP.      We will obtain some labs to rule out any infectious process and I will call him if he has any abnormal labs.     I gave him a sample of Breo 100 to use for 2 weeks and see if this helps his breathing.  I showed him how to use the inhaler and to rinse his mouth out after each use.  If he benefits from the inhaler I will send this in for  him.      He will follow up in 4 weeks and see how he is improving.  He will need a 6MWT at his next appointment.  We will get PFT's in the near future.    Level of service justified based on 43 minutes spent in patient care on this date of service including, but not limited to: preparing to see the patient, obtaining and/or reviewing history, performing medically appropriate examination, ordering tests/medicine/procedures, independently interpreting results, documenting clinical information in EHR, and counseling/education of patient/family/caregiver. (Level 4 30-39 minutes; Level 5 40-54 minutes)      Loraine Wilson, APRN  01/19/202210:07 EST  Electronically signed     Please note that portions of this note were completed with a voice recognition program.        CC: Schumer, Barry, MD

## 2022-01-21 LAB
ACE SERPL-CCNC: 31 U/L (ref 14–82)
M PNEUMO IGG SER IA-ACNC: <100 U/ML (ref 0–99)
M PNEUMO IGM SER IA-ACNC: <770 U/ML (ref 0–769)

## 2022-01-22 LAB
C-ANCA TITR SER IF: NORMAL TITER
GAMMA INTERFERON BACKGROUND BLD IA-ACNC: 0 IU/ML
M TB IFN-G BLD-IMP: NEGATIVE
M TB IFN-G CD4+ BCKGRND COR BLD-ACNC: 0 IU/ML
M TB IFN-G CD4+CD8+ BCKGRND COR BLD-ACNC: 0 IU/ML
MITOGEN IGNF BLD-ACNC: >10 IU/ML
MYELOPEROXIDASE AB SER IA-ACNC: <9 U/ML (ref 0–9)
P-ANCA ATYPICAL TITR SER IF: NORMAL TITER
P-ANCA TITR SER IF: NORMAL TITER
PROTEINASE3 AB SER IA-ACNC: <3.5 U/ML (ref 0–3.5)
SERVICE CMNT-IMP: NORMAL

## 2022-01-23 LAB
A FLAVUS AB SER QL ID: NEGATIVE
A FLAVUS AB SER QL ID: NEGATIVE
A FUMIGATUS AB SER QL ID: NEGATIVE
A FUMIGATUS AB SER QL ID: NEGATIVE
A NIGER AB SER QL ID: NEGATIVE
A NIGER AB SER QL ID: NEGATIVE
B DERMAT AB TITR SER: NEGATIVE {TITER}

## 2022-01-24 LAB
A FUMIGATUS IGE QN: <0.1 KU/L
A1AT PHENOTYP SERPL IFE: ABNORMAL
A1AT SERPL-MCNC: 207 MG/DL (ref 101–187)

## 2022-01-26 LAB
A ALTERNATA IGE QN: <0.1 KU/L
A FUMIGATUS IGE QN: <0.1 KU/L
AMER ROACH IGE QN: <0.1 KU/L
BAHIA GRASS IGE QN: <0.1 KU/L
BERMUDA GRASS IGE QN: <0.1 KU/L
BOXELDER IGE QN: <0.1 KU/L
C HERBARUM IGE QN: <0.1 KU/L
CAT DANDER IGE QN: <0.1 KU/L
CMN PIGWEED IGE QN: <0.1 KU/L
COMMON RAGWEED IGE QN: <0.1 KU/L
CONV CLASS DESCRIPTION: NORMAL
D FARINAE IGE QN: <0.1 KU/L
D PTERONYSS IGE QN: <0.1 KU/L
DOG DANDER IGE QN: <0.1 KU/L
ENGL PLANTAIN IGE QN: <0.1 KU/L
HAZELNUT POLN IGE QN: <0.1 KU/L
JOHNSON GRASS IGE QN: <0.1 KU/L
KENT BLUE GRASS IGE QN: <0.1 KU/L
LONDON PLANE IGE QN: <0.1 KU/L
M RACEMOSUS IGE QN: <0.1 KU/L
MT JUNIPER IGE QN: <0.1 KU/L
MUGWORT IGE QN: <0.1 KU/L
NETTLE IGE QN: <0.1 KU/L
P NOTATUM IGE QN: <0.1 KU/L
S BOTRYOSUM IGE QN: <0.1 KU/L
SHEEP SORREL IGE QN: <0.1 KU/L
SWEET GUM IGE QN: <0.1 KU/L
WHITE ELM IGE QN: <0.1 KU/L
WHITE HICKORY IGE QN: <0.1 KU/L
WHITE MULBERRY IGE QN: <0.1 KU/L
WHITE OAK IGE QN: <0.1 KU/L

## 2022-02-16 ENCOUNTER — OFFICE VISIT (OUTPATIENT)
Dept: PULMONOLOGY | Facility: CLINIC | Age: 82
End: 2022-02-16

## 2022-02-16 VITALS
HEART RATE: 85 BPM | BODY MASS INDEX: 33.76 KG/M2 | TEMPERATURE: 97.3 F | OXYGEN SATURATION: 93 % | DIASTOLIC BLOOD PRESSURE: 60 MMHG | SYSTOLIC BLOOD PRESSURE: 122 MMHG | WEIGHT: 235.8 LBS | HEIGHT: 70 IN

## 2022-02-16 DIAGNOSIS — G47.33 OSA (OBSTRUCTIVE SLEEP APNEA): ICD-10-CM

## 2022-02-16 DIAGNOSIS — R06.02 SHORTNESS OF BREATH: Primary | ICD-10-CM

## 2022-02-16 DIAGNOSIS — J84.9 ILD (INTERSTITIAL LUNG DISEASE): ICD-10-CM

## 2022-02-16 PROCEDURE — 99215 OFFICE O/P EST HI 40 MIN: CPT | Performed by: NURSE PRACTITIONER

## 2022-02-16 PROCEDURE — 94618 PULMONARY STRESS TESTING: CPT | Performed by: NURSE PRACTITIONER

## 2022-02-17 NOTE — PROGRESS NOTES
[facility] Pulmonary Follow up    CHIEF COMPLAINT    Dyspnea/leg pain at night    HISTORY OF PRESENT ILLNESS    Pablo Badillo is a 81 y.o.male here today for follow-up.  He was last seen in the office by me in January.  He had previously been hospitalized in November for acute respiratory failure with hypoxia.  He was discharged on a prednisone taper and and completed that at his last appointment.    He has noticed improvement in his breathing since his last appointment.  He states he is using 2 L continuously throughout the day and wearing 1 L at night through his CPAP.    He has been using his CPAP with 1 L bled into the machine for about 3 weeks.  He has noticed an improvement in his breathing since using his CPAP every night.    He had been working with PT and tries to do daily exercises to help with his breathing.    He does continue to be short of breath with exertion but does recover fairly quickly at rest.    He denies fever, chills, sputum production, hemoptysis, night sweats, weight loss, chest pain or palpitations.  He denies any lower extremity edema or calf tenderness.  He denies any sinus or allergy symptoms.  He denies reflux symptoms.    He quit smoking in 1971 has a 45-pack-year history.  He is accompanied today by his wife.    He is up-to-date on his current vaccinations.    Patient Active Problem List   Diagnosis   • Peripheral neuropathy   • Essential hypertension   • Hyperlipidemia   • BPH (benign prostatic hyperplasia)   • PARUL (obstructive sleep apnea)   • Shortness of breath   • ILD (interstitial lung disease) ([facility])   • Rheumatoid arthritis involving multiple sites ([facility])       No Known Allergies    Current Outpatient Medications:   •  atorvastatin (LIPITOR) 10 MG tablet, Take 10 mg by mouth Daily., Disp: , Rfl:   •  escitalopram (LEXAPRO) 20 MG tablet, Take 20 mg by mouth Daily., Disp: , Rfl:   •  finasteride (PROSCAR) 5 MG tablet, Take 5 mg by mouth Daily., Disp: , Rfl:   •  gabapentin  (NEURONTIN) 300 MG capsule, Take 1 capsule by mouth Every Night., Disp: , Rfl:   •  potassium chloride ER (K-TAB) 20 MEQ tablet controlled-release ER tablet, , Disp: , Rfl:   •  tamsulosin (FLOMAX) 0.4 MG capsule 24 hr capsule, Take 1 capsule by mouth Daily., Disp: , Rfl:   MEDICATION LIST AND ALLERGIES REVIEWED.    Social History     Tobacco Use   • Smoking status: Former Smoker     Packs/day: 3.00     Years: 15.00     Pack years: 45.00     Types: Cigarettes     Start date: 1956     Quit date:      Years since quittin.6   • Smokeless tobacco: Never Used   Vaping Use   • Vaping Use: Never used   Substance Use Topics   • Alcohol use: Yes     Alcohol/week: 7.0 standard drinks     Types: 7 Glasses of wine per week   • Drug use: Never       FAMILY AND SOCIAL HISTORY REVIEWED.    Review of Systems   Constitutional: Negative for activity change, appetite change, fatigue, fever and unexpected weight change.   HENT: Negative for congestion, postnasal drip, rhinorrhea, sinus pressure, sore throat and voice change.    Eyes: Negative for visual disturbance.   Respiratory: Positive for shortness of breath. Negative for cough, chest tightness and wheezing.    Cardiovascular: Negative for chest pain, palpitations and leg swelling.   Gastrointestinal: Negative for abdominal distention, abdominal pain, nausea and vomiting.   Endocrine: Negative for cold intolerance and heat intolerance.   Genitourinary: Negative for difficulty urinating and urgency.   Musculoskeletal: Negative for arthralgias, back pain and neck pain.   Skin: Negative for color change and pallor.   Allergic/Immunologic: Negative for environmental allergies and food allergies.   Neurological: Negative for dizziness, syncope, weakness and light-headedness.   Hematological: Negative for adenopathy. Does not bruise/bleed easily.   Psychiatric/Behavioral: Negative for agitation and behavioral problems.   .    /60   Pulse 85   Temp 97.3 °F (36.3 °C)   " Ht 177.8 cm (70\")   Wt 107 kg (235 lb 12.8 oz)   SpO2 93%   BMI 33.83 kg/m²     Immunization History   Administered Date(s) Administered   • COVID-19 (PFIZER) PURPLE CAP 12/29/2020, 01/18/2021, 09/29/2021       Physical Exam  Vitals and nursing note reviewed.   Constitutional:       Appearance: He is well-developed. He is not diaphoretic.   HENT:      Head: Normocephalic and atraumatic.   Eyes:      Pupils: Pupils are equal, round, and reactive to light.   Neck:      Thyroid: No thyromegaly.   Cardiovascular:      Rate and Rhythm: Normal rate and regular rhythm.      Heart sounds: Normal heart sounds. No murmur heard.  No friction rub. No gallop.    Pulmonary:      Effort: Pulmonary effort is normal. No respiratory distress.      Breath sounds: Normal breath sounds. No wheezing or rales.   Chest:      Chest wall: No tenderness.   Abdominal:      General: Bowel sounds are normal.      Palpations: Abdomen is soft.      Tenderness: There is no abdominal tenderness.   Musculoskeletal:         General: No swelling. Normal range of motion.      Cervical back: Normal range of motion and neck supple.   Lymphadenopathy:      Cervical: No cervical adenopathy.   Skin:     General: Skin is warm and dry.      Capillary Refill: Capillary refill takes less than 2 seconds.   Neurological:      Mental Status: He is alert and oriented to person, place, and time.   Psychiatric:         Mood and Affect: Mood normal.         Behavior: Behavior normal.           RESULTS    6-minute walk test: Patient ambulated 400 feet and had to stop multiple times due to his dyspnea.  He required up to 4 L continuous during his walk today.  He continues to require oxygen with activity.    PROBLEM LIST    Problem List Items Addressed This Visit        Pulmonary and Pneumonias    Shortness of breath - Primary    Relevant Orders    6 Minute Walk Test (Completed)    ILD (interstitial lung disease) ([facility])       Sleep    PARUL (obstructive sleep apnea)    "         DISCUSSION    Mr. Badillo was here for follow-up.  He appears to look well in the office today.  We did review his 6-minute walk test and he was requiring more oxygen than what he has been using at home.  He states that today his shortness of breath is worse than normal.  Wearing 2 L continuously at home.  I did encourage him to wear anywhere from 2 to 4 L with activity at home and to monitor his oxygenation closely.    He does not complain of any worsening breathing difficulties when compared to his last appointment.  We did discuss trying a longer prednisone taper but he would like to avoid this as he was on prednisone for 6 months in the past for his RA.  While he was on his prednisone taper from his hospitalization he did not notice a significant improvement in his breathing.    We did review his lab work that was completed at his last appointment and all of his results were negative.  His CRP was elevated.    He will continue to wear his CPAP every night with 1 L bled into the machine.  He seems to be feeling better and has had less dizziness since using his CPAP at night.    We will have him come in for a close follow-up in 2 to 3 months with Dr. Ferro with full PFTs to reevaluate his lung function.  I did advise him if he were to worsen before this appointment he is to call and we would see him sooner.  We will also obtain a chest x-ray when he returns to the office.      Level of service justified based on 42 minutes spent in patient care on this date of service including, but not limited to: preparing to see the patient, obtaining and/or reviewing history, performing medically appropriate examination, ordering tests/medicine/procedures, independently interpreting results, documenting clinical information in EHR, and counseling/education of patient/family/caregiver. (Level 4 30-39 minutes; Level 5 40-54 minutes)      Loraine Wilson, HERMES  02/16/202211:02 EST  Electronically signed     Please note  that portions of this note were completed with a voice recognition program.        CC: Schumer, Barry, MD

## 2022-04-11 ENCOUNTER — OFFICE VISIT (OUTPATIENT)
Dept: SLEEP MEDICINE | Facility: HOSPITAL | Age: 82
End: 2022-04-11

## 2022-04-11 VITALS
SYSTOLIC BLOOD PRESSURE: 120 MMHG | HEIGHT: 70 IN | DIASTOLIC BLOOD PRESSURE: 54 MMHG | OXYGEN SATURATION: 92 % | WEIGHT: 230 LBS | BODY MASS INDEX: 32.93 KG/M2 | HEART RATE: 79 BPM

## 2022-04-11 DIAGNOSIS — G47.34 NOCTURNAL HYPOXEMIA: ICD-10-CM

## 2022-04-11 DIAGNOSIS — G47.33 OSA (OBSTRUCTIVE SLEEP APNEA): Primary | ICD-10-CM

## 2022-04-11 PROCEDURE — 99213 OFFICE O/P EST LOW 20 MIN: CPT | Performed by: NURSE PRACTITIONER

## 2022-04-11 NOTE — PROGRESS NOTES
Chief Complaint:   Chief Complaint   Patient presents with   • Follow-up       HPI:    Pablo Badillo is a 81 y.o. male here for follow-up of sleep apnea.  Patient was originally seen in consult due to worsening hypoxemic respiratory failure while in the hospital he did have a sleep study with us did show severe obstructive sleep apnea with an AHI of 74.5.  He continues to do 1 L of oxygen that goes through CPAP each night.  He does use O2 at 2 L during the day.  Patient states he seems to be doing very well.  He sleeps 11 hours nightly and does feel rested upon awakening.  He goes to sleep within 15 minutes and does not get up during the night.  Patient has an [place] score of 5/24.  Patient does feel that CPAP is beneficial.  He has no concerns or complaints and wishes to continue therapy.        Current medications are:   Current Outpatient Medications:   •  ATENOLOL PO, , Disp: , Rfl:   •  atorvastatin (LIPITOR) 10 MG tablet, Take 10 mg by mouth Daily., Disp: , Rfl:   •  escitalopram (LEXAPRO) 20 MG tablet, Take 20 mg by mouth Daily., Disp: , Rfl:   •  finasteride (PROSCAR) 5 MG tablet, Take 5 mg by mouth Daily., Disp: , Rfl:   •  gabapentin (NEURONTIN) 300 MG capsule, Take 1 capsule by mouth Every Night., Disp: , Rfl:   •  potassium chloride ER (K-TAB) 20 MEQ tablet controlled-release ER tablet, , Disp: , Rfl:   •  tamsulosin (FLOMAX) 0.4 MG capsule 24 hr capsule, Take 1 capsule by mouth Daily., Disp: , Rfl: .      The patient's relevant past medical, surgical, family and social history were reviewed and updated in Epic as appropriate.       Review of Systems   Eyes: Positive for visual disturbance.   Respiratory: Positive for apnea.    Cardiovascular: Positive for leg swelling.   Gastrointestinal:        Heartburn   Musculoskeletal: Positive for arthralgias, gait problem, joint swelling and myalgias.   Neurological: Positive for numbness.   Psychiatric/Behavioral: Positive for dysphoric mood and sleep  "disturbance. The patient is nervous/anxious.    All other systems reviewed and are negative.        Objective:    Physical Exam  Constitutional:       Appearance: Normal appearance.   HENT:      Head: Normocephalic and atraumatic.      Mouth/Throat:      Comments: Class 3 airway   Cardiovascular:      Rate and Rhythm: Normal rate and regular rhythm.   Pulmonary:      Effort: Pulmonary effort is normal.      Breath sounds: Normal breath sounds.   Skin:     General: Skin is warm and dry.   Neurological:      Mental Status: He is alert and oriented to person, place, and time.   Psychiatric:         Mood and Affect: Mood normal.         Behavior: Behavior normal.         Thought Content: Thought content normal.         Judgment: Judgment normal.       /54 (BP Location: Left arm, Patient Position: Sitting, Cuff Size: Adult)   Pulse 79   Ht 177.8 cm (70\")   Wt 104 kg (230 lb)   SpO2 92%   BMI 33.00 kg/m²     30/30 days of use  Greater than 4-hour use 100%  AHI of 0  95th percentile pressure 12.5  Settings 12-20  ASSESSMENT/PLAN    Diagnoses and all orders for this visit:    1. PARUL (obstructive sleep apnea) (Primary)  -     PAP Therapy    2. Nocturnal hypoxemia            1. Counseled patient regarding multimodal approach with healthy nutrition, healthy sleep, regular physical activity, social activities, counseling, and medications. Encouraged to practice lateral sleep position. Avoid alcohol and sedatives close to bedtime.  2.   Refill supplies x1 year.  Patient will continue to use O2 1 L bleeding through CPAP I will see him back in 1 year.  I have reviewed the results of my evaluation and impression and discussed my recommendations in detail with the patient.      Signed by  HERMES Singh    April 11, 2022      CC: Schumer, Barry, MD          No ref. provider found      "

## 2022-04-26 DIAGNOSIS — Z01.812 BLOOD TESTS PRIOR TO TREATMENT OR PROCEDURE: Primary | ICD-10-CM

## 2022-04-27 ENCOUNTER — CLINICAL SUPPORT NO REQUIREMENTS (OUTPATIENT)
Dept: PULMONOLOGY | Facility: CLINIC | Age: 82
End: 2022-04-27

## 2022-04-27 DIAGNOSIS — Z01.812 BLOOD TESTS PRIOR TO TREATMENT OR PROCEDURE: ICD-10-CM

## 2022-04-27 PROCEDURE — U0004 COV-19 TEST NON-CDC HGH THRU: HCPCS | Performed by: INTERNAL MEDICINE

## 2022-04-27 PROCEDURE — U0005 INFEC AGEN DETEC AMPLI PROBE: HCPCS | Performed by: INTERNAL MEDICINE

## 2022-04-27 PROCEDURE — 99211 OFF/OP EST MAY X REQ PHY/QHP: CPT | Performed by: INTERNAL MEDICINE

## 2022-04-28 LAB — SARS-COV-2 RNA NOSE QL NAA+PROBE: NOT DETECTED

## 2022-04-29 ENCOUNTER — OFFICE VISIT (OUTPATIENT)
Dept: PULMONOLOGY | Facility: CLINIC | Age: 82
End: 2022-04-29

## 2022-04-29 VITALS
OXYGEN SATURATION: 96 % | BODY MASS INDEX: 32.64 KG/M2 | WEIGHT: 228 LBS | DIASTOLIC BLOOD PRESSURE: 60 MMHG | TEMPERATURE: 96.9 F | HEIGHT: 70 IN | HEART RATE: 64 BPM | RESPIRATION RATE: 16 BRPM | SYSTOLIC BLOOD PRESSURE: 134 MMHG

## 2022-04-29 DIAGNOSIS — J96.11 CHRONIC HYPOXEMIC RESPIRATORY FAILURE: ICD-10-CM

## 2022-04-29 DIAGNOSIS — G47.33 OSA ON CPAP: ICD-10-CM

## 2022-04-29 DIAGNOSIS — Z99.89 OSA ON CPAP: ICD-10-CM

## 2022-04-29 DIAGNOSIS — J84.9 ILD (INTERSTITIAL LUNG DISEASE): Primary | ICD-10-CM

## 2022-04-29 DIAGNOSIS — E66.9 OBESITY (BMI 30-39.9): ICD-10-CM

## 2022-04-29 PROCEDURE — 99214 OFFICE O/P EST MOD 30 MIN: CPT | Performed by: INTERNAL MEDICINE

## 2022-04-29 PROCEDURE — 94060 EVALUATION OF WHEEZING: CPT | Performed by: INTERNAL MEDICINE

## 2022-04-29 PROCEDURE — 94726 PLETHYSMOGRAPHY LUNG VOLUMES: CPT | Performed by: INTERNAL MEDICINE

## 2022-04-29 PROCEDURE — 94618 PULMONARY STRESS TESTING: CPT | Performed by: INTERNAL MEDICINE

## 2022-04-29 PROCEDURE — 94729 DIFFUSING CAPACITY: CPT | Performed by: INTERNAL MEDICINE

## 2022-04-29 RX ORDER — ALBUTEROL SULFATE 90 UG/1
4 AEROSOL, METERED RESPIRATORY (INHALATION) ONCE
Status: COMPLETED | OUTPATIENT
Start: 2022-04-29 | End: 2022-04-29

## 2022-04-29 RX ORDER — ATENOLOL 25 MG/1
12.5 TABLET ORAL DAILY
COMMUNITY
Start: 2022-04-25

## 2022-04-29 RX ADMIN — ALBUTEROL SULFATE 4 PUFF: 90 AEROSOL, METERED RESPIRATORY (INHALATION) at 11:29

## 2022-04-29 NOTE — PROGRESS NOTES
Follow Up Office Visit      Patient Name: Pablo Badillo    Chief Complaint:    Chief Complaint   Patient presents with   • Shortness of Breath     F/u   • Sleep Apnea       History of Present Illness: Pablo Badillo is a 81 y.o. male who is here today for follow up for sleep apnea and shortness of breath.    81-year-old male with past medical history of  hypertension, dyslipidemia, RA, BPH, chronic thrombocytopenia, GERD, arthritis and peripheral neuropathy, remote history of smoking. Pt was recently admitted with worsening hypoxemic resp failure and was discharged on home O2. Pt was set up for outpatient sleep study as concern for sleep disordered breathing was high.  Patient also had significant abnormality noted on CT scan with bilateral pleural parenchymal inflammation and cystic changes in the upper lobes.  Patient was treated with systemic steroids sed rate was high.  Work-up for immunologic causes was all negative.  Patient was discharged home on supplemental oxygen as well.  Underwent sleep study and was found to have severe obstructive sleep apnea with AHI 74.7.  He also had residual sleep-related hypoxia and was treated with 1 L supplemental oxygen.  Since then he has been on auto CPAP and 1 L oxygen.    Patient comes today for follow-up and states that things are going very well.  He feels that he is slowly improving.  States that CPAP is working great and he has been sleeping much better with that.  He uses it every night and sleep apnea is corrected.  He feels more energetic during the daytime however limited with his arthritic issues for activity.  He states that CPAP has cleared out his sinuses and he is not having any sinus problems anymore.  He used to have a lot of GI side effects and all those have resolved as well.  He is happy with his progress thus far.  He continues to use oxygen during the daytime.  We did another 6-minute walk test on him today and his oxygen requirements seem to be  "reducing and he is now requiring only 2 L oxygen instead of 6 L which was previously.  Reviewed with him.  He is likely will not need oxygen during rest.  He verbalized understanding.  Denies any recent illnesses requiring hospitalizations or ER visits.  States that he has seen orthopedics and they are recommending surgery for his arthritic issues but he is not keen on doing that.    Patient currently is not using any inhaler therapy either.  States that did not think it helped him.    Patient is up-to-date on his COVID-19 immunization as well.      Subjective      Review of Systems:   Review of Systems   Constitutional: Positive for fatigue.   HENT: Negative.    Respiratory: Positive for shortness of breath.    Cardiovascular: Negative.    Gastrointestinal: Negative.    Endocrine: Negative.    Musculoskeletal: Positive for arthralgias.   Skin: Negative.    Neurological: Negative.    Hematological: Negative.    Psychiatric/Behavioral: Positive for sleep disturbance.   All other systems reviewed and are negative.      The following portions of the patient's history were reviewed and updated as appropriate: allergies, current medications, past family history, past medical history, past social history, past surgical history and problem list.    Objective     Physical Exam:  Vital Signs:   Vitals:    04/29/22 1138   BP: 134/60   BP Location: Left arm   Patient Position: Sitting   Cuff Size: Adult   Pulse: 64   Resp: 16   Temp: 96.9 °F (36.1 °C)   SpO2: 96%   Weight: 103 kg (228 lb)   Height: 177.8 cm (70\")   PF: (!) 2 L/min  Comment: Pulse at resting     Body mass index is 32.71 kg/m².    Physical Exam  Vitals and nursing note reviewed.   Constitutional:       General: He is not in acute distress.     Appearance: He is well-developed. He is not diaphoretic.   HENT:      Head: Normocephalic and atraumatic.      Comments: Mallampati 3 airway     Nose: Nose normal.      Mouth/Throat:      Pharynx: No oropharyngeal exudate. "   Eyes:      General:         Right eye: No discharge.         Left eye: No discharge.      Pupils: Pupils are equal, round, and reactive to light.   Neck:      Thyroid: No thyromegaly.      Trachea: No tracheal deviation.   Cardiovascular:      Rate and Rhythm: Normal rate and regular rhythm.      Heart sounds: Normal heart sounds. No murmur heard.    No friction rub. No gallop.   Pulmonary:      Effort: Pulmonary effort is normal. No respiratory distress.      Breath sounds: Normal breath sounds. No wheezing.   Musculoskeletal:         General: No tenderness.      Cervical back: Neck supple.      Right lower leg: No edema.      Left lower leg: No edema.      Comments: Clubbing: none   Lymphadenopathy:      Cervical: No cervical adenopathy.   Neurological:      Mental Status: He is alert and oriented to person, place, and time.      Cranial Nerves: No cranial nerve deficit.      Coordination: Coordination normal.   Psychiatric:         Behavior: Behavior normal.         Thought Content: Thought content normal.         Judgment: Judgment normal.         Results Review:   Multiple studies reviewed personally and as below.    PFTs reviewed and showed moderate obstruction with FEV1 of 1.79 L, 66% predicted.  No significant bronchodilator response.  Normal lung volumes but severely reduced DLCO 32%.    6-minute walk test showed patient's room air saturations are 98%.  Patient walked for a total of 4 minutes before needing supplemental oxygen.  Required up to 2 L of oxygen to maintain saturation above 88%.  He walked total of 600 feet which is 40% predicted.    CPAP download data reviewed for last 90 days.  Patient used the device more than 4 hours 97.8% of the nights.  Average usage is 9 hours and 15 minutes.  95th percentile pressure of 13.2.  Intermittent high leak noted.  AHI 0.3.  No significant central events noted either.    Previous CT scan reviewed and were showing improvement in bilateral groundglass opacities  and some of the cystic changes in the right upper lobe seem to have improved some as well.    Chest x-ray done today reviewed and showed improvement in left basilar opacity.  Right sided infiltrative changes persisting but not any further worse.      Assessment / Plan      Assessment:   Problem List Items Addressed This Visit        Pulmonary and Pneumonias    ILD (interstitial lung disease) (HCC) - Primary    Relevant Medications    albuterol sulfate HFA (PROVENTIL HFA;VENTOLIN HFA;PROAIR HFA) inhaler 4 puff (Completed)    Other Relevant Orders    XR Chest PA & Lateral    Pulmonary Function Test (Completed)    6 Minute Walk Test (Completed)    CT Chest Hi Resolution Diagnostic      Other Visit Diagnoses     PARUL on CPAP        Chronic hypoxemic respiratory failure (HCC)        Obesity (BMI 30-39.9)              Plan:   1. Pt with severe PARUL and sleep related hypoxia.  Doing well with CPAP.  Compliance is excellent.  AHI treated.  Subjectively improved.  Continue closer monitoring.  Denies any side effects from therapy but only improvement.  2.  Patient does have abnormal chest CT scan and recent hospitalization for respiratory failure.  Does have interstitial lung disease component.  Unclear etiology.  Patient is not seeing rheumatology for his rheumatoid arthritis.  No other inflammatory or vasculitic serologies positive either.  Discussed that sometimes we need to proceed with open lung biopsy to define what is causing his issues.  But clinically is feeling better.  He is not very interested in doing any invasive procedures.  I think this is reasonable.  I will like to get him back in 3 to 4 months with a high-resolution CT scan to relook at things.  Clinically he is definitely better and his oxygen requirements are improving as well.  Reviewed his PFTs and likely have moderate obstruction.  Patient did not benefit from inhaler therapy in the past.  Discussed that if symptoms get worse we could reattempt or have  him on nebulizer therapy will let me know.  Continue close monitoring.  If he continues to have recurrent infections or hospitalization then will need to discuss  VATS biopsy again.  3. Increasing activity advised patient is very limited due to his arthritic issues.  He understands the importance of exercise..   4. Pt is followed by rheumatology as well and now off prednisone.  Seen orthopedics but he does not want to undergo any surgical intervention.  5.  Patient on chronic hypoxemic respiratory failure.  Continue supplemental oxygen with exertional activity and during sleep along with CPAP.  He does spot checks his oxygen at home at rest and states that mostly it has been okay and sometimes drops below 88 then he uses oxygen which is reasonable approach at this point.    We will see patient back with high-resolution CT scan.  We will see him sooner if needed.    Follow Up:   3 to 4 months with high-resolution CT scan.    Discussed plan of care in detail with patient today. Patient verbally understands and agrees.I spent 32 minutes on this date of service. This time includes time spent by me in the following activities:preparing for the visit, reviewing tests, obtaining and/or reviewing a separately obtained history, performing a medically appropriate examination, counseling the patient/family, ordering tests and procedures and/or documenting information in the medical record.       Dariusz Ferro MD  Pulmonary Critical Care and Sleep Medicine    Please note that portions of this note were completed with a voice recognition program.

## 2022-08-22 ENCOUNTER — HOSPITAL ENCOUNTER (OUTPATIENT)
Dept: CT IMAGING | Facility: HOSPITAL | Age: 82
Discharge: HOME | End: 2022-08-22
Admitting: INTERNAL MEDICINE

## 2022-08-22 DIAGNOSIS — J84.9 ILD (INTERSTITIAL LUNG DISEASE): ICD-10-CM

## 2022-08-22 PROCEDURE — 71250 CT THORAX DX C-: CPT

## 2022-09-02 ENCOUNTER — OFFICE VISIT (OUTPATIENT)
Dept: PULMONOLOGY | Facility: CLINIC | Age: 82
End: 2022-09-02

## 2022-09-02 VITALS
WEIGHT: 232 LBS | BODY MASS INDEX: 33.21 KG/M2 | HEART RATE: 81 BPM | TEMPERATURE: 98 F | OXYGEN SATURATION: 95 % | DIASTOLIC BLOOD PRESSURE: 70 MMHG | SYSTOLIC BLOOD PRESSURE: 140 MMHG | HEIGHT: 70 IN

## 2022-09-02 DIAGNOSIS — E66.9 OBESITY (BMI 30-39.9): ICD-10-CM

## 2022-09-02 DIAGNOSIS — J96.11 CHRONIC HYPOXEMIC RESPIRATORY FAILURE: ICD-10-CM

## 2022-09-02 DIAGNOSIS — G47.33 OSA ON CPAP: ICD-10-CM

## 2022-09-02 DIAGNOSIS — Z99.89 OSA ON CPAP: ICD-10-CM

## 2022-09-02 DIAGNOSIS — J84.9 ILD (INTERSTITIAL LUNG DISEASE): Primary | ICD-10-CM

## 2022-09-02 PROCEDURE — 99214 OFFICE O/P EST MOD 30 MIN: CPT | Performed by: INTERNAL MEDICINE

## 2022-09-02 RX ORDER — FUROSEMIDE 40 MG/1
1 TABLET ORAL DAILY
COMMUNITY
Start: 2022-06-23

## 2023-01-24 ENCOUNTER — OFFICE VISIT (OUTPATIENT)
Dept: PULMONOLOGY | Facility: CLINIC | Age: 83
End: 2023-01-24
Payer: MEDICARE

## 2023-01-24 VITALS
OXYGEN SATURATION: 96 % | SYSTOLIC BLOOD PRESSURE: 128 MMHG | TEMPERATURE: 98 F | HEIGHT: 70 IN | BODY MASS INDEX: 33.9 KG/M2 | WEIGHT: 236.8 LBS | HEART RATE: 86 BPM | DIASTOLIC BLOOD PRESSURE: 78 MMHG

## 2023-01-24 DIAGNOSIS — Z01.818 PREOPERATIVE CLEARANCE: Primary | ICD-10-CM

## 2023-01-24 DIAGNOSIS — G47.33 OSA (OBSTRUCTIVE SLEEP APNEA): ICD-10-CM

## 2023-01-24 DIAGNOSIS — J84.9 ILD (INTERSTITIAL LUNG DISEASE): ICD-10-CM

## 2023-01-24 DIAGNOSIS — R06.02 SHORTNESS OF BREATH: ICD-10-CM

## 2023-01-24 PROCEDURE — 94375 RESPIRATORY FLOW VOLUME LOOP: CPT | Performed by: NURSE PRACTITIONER

## 2023-01-24 PROCEDURE — 99214 OFFICE O/P EST MOD 30 MIN: CPT | Performed by: NURSE PRACTITIONER

## 2023-01-24 PROCEDURE — 94726 PLETHYSMOGRAPHY LUNG VOLUMES: CPT | Performed by: NURSE PRACTITIONER

## 2023-01-24 PROCEDURE — 94729 DIFFUSING CAPACITY: CPT | Performed by: NURSE PRACTITIONER

## 2023-01-24 NOTE — PROGRESS NOTES
Pre-operative Clearance    Chief Complaint   Patient presents with   • Shortness of Breath   • Follow-up       HPI     Pablo Badillo is a pleasant 82 y.o. male who has been referred for preoperative exam.  He has been having difficulty with his right hip for quite some time and sees a surgeon next week.  He does not know if surgery is going to be an option but wanted to have preoperative clearance prior to this appointment to decide how he shall proceed.    He was last seen in the office by Dr. Ferro in September.  He denies any respiratory illnesses since his last appointment.  He states he is felt pretty well from a pulmonary standpoint.    He has been using his oxygen at nighttime at 3 L bled into his CPAP.  He denies any sleeping difficulties.  He does wear his CPAP all night long.    He has been using his oxygen at 1 to 2 L pulsed dose occasionally during the day but does not wear his oxygen much.  He does check his oxygen level and has been above 94% majority of the time on room air.  Occasionally when he ambulates his oxygen will drop below 88% but he takes a break and recovers within a couple of minutes.    He denies any sputum production or hemoptysis.  He denies any chest pain or palpitations.  He denies any lower extremity edema or calf tenderness.  He denies any swallowing difficulties.  He denies any sinus or allergy symptoms currently.    He is unable to walk long distances due to his leg pain and right hip pain.    He has a history of an abnormal CT scan and was treated with steroids for a an elevated sed rate.  His CT scan did show resolution of the opacities in August 2022.    Past Medical History:   Diagnosis Date   • BCC (basal cell carcinoma of skin)    • BPH (benign prostatic hyperplasia)    • GERD (gastroesophageal reflux disease)    • Hyperlipidemia    • Hypertension    • IBS (irritable bowel syndrome)    • Idiopathic peripheral neuropathy    • ILD (interstitial lung disease) ([facility])  2022   • PARUL (obstructive sleep apnea)    • Peptic ulcer    • Pneumonia    • Primary central sleep apnea    • Renal cyst     left   • Rheumatoid arthritis (HCC)    • Sinusitis    • Thrombocytopenia (HCC)        Past Surgical History:   Procedure Laterality Date   • AMPUTATION DIGIT      tip of left third digit   • CATARACT EXTRACTION     • HEMORRHOIDECTOMY     • HYDROCELE EXCISION / REPAIR         Family History   Problem Relation Age of Onset   • Hypertension Mother    • Obesity Mother    • Cancer Father         Colon       Social History     Socioeconomic History   • Marital status:    Tobacco Use   • Smoking status: Former     Packs/day: 3.00     Years: 15.00     Pack years: 45.00     Types: Cigarettes     Start date: 1956     Quit date: 1971     Years since quittin.6   • Smokeless tobacco: Never   Vaping Use   • Vaping Use: Never used   Substance and Sexual Activity   • Alcohol use: Yes     Alcohol/week: 7.0 standard drinks     Types: 7 Glasses of wine per week   • Drug use: Never   • Sexual activity: Not Currently     Partners: Female       No Known Allergies    ROS    Review of Systems   Constitutional: Negative for activity change, chills, fever and unexpected weight loss.   HENT: Negative for congestion, hearing loss, postnasal drip, rhinorrhea, sinus pressure, sore throat and voice change.    Eyes: Negative for blurred vision and visual disturbance.   Respiratory: Positive for shortness of breath. Negative for apnea, cough and wheezing.    Cardiovascular: Negative for chest pain and palpitations.   Gastrointestinal: Negative for abdominal pain, nausea, vomiting and GERD.   Endocrine: Negative for cold intolerance.   Genitourinary: Negative for difficulty urinating and urinary incontinence.   Musculoskeletal: Negative for back pain, joint swelling and myalgias.   Skin: Negative for color change and pallor.   Allergic/Immunologic: Negative for food allergies.   Neurological:  Negative for weakness, numbness, headache and confusion.   Hematological: Negative for adenopathy.   Psychiatric/Behavioral: Negative for agitation, behavioral problems and depressed mood.       Vitals:    01/24/23 1340   BP: 128/78   Pulse: 86   Temp: 98 °F (36.7 °C)   SpO2: 96%         Current Outpatient Medications:   •  atenolol (TENORMIN) 25 MG tablet, Take 12.5 mg by mouth Daily., Disp: , Rfl:   •  atorvastatin (LIPITOR) 10 MG tablet, Take 10 mg by mouth Daily., Disp: , Rfl:   •  escitalopram (LEXAPRO) 20 MG tablet, Take 20 mg by mouth Daily., Disp: , Rfl:   •  finasteride (PROSCAR) 5 MG tablet, Take 5 mg by mouth Daily., Disp: , Rfl:   •  furosemide (LASIX) 40 MG tablet, Take 1 tablet by mouth Daily., Disp: , Rfl:   •  gabapentin (NEURONTIN) 300 MG capsule, Take 1 capsule by mouth Every Night., Disp: , Rfl:   •  potassium chloride ER (K-TAB) 20 MEQ tablet controlled-release ER tablet, , Disp: , Rfl:   •  tamsulosin (FLOMAX) 0.4 MG capsule 24 hr capsule, Take 1 capsule by mouth Daily., Disp: , Rfl:     PE    Physical Exam  Vitals and nursing note reviewed.   Constitutional:       Appearance: He is well-developed. He is not diaphoretic.   HENT:      Head: Normocephalic and atraumatic.   Eyes:      Pupils: Pupils are equal, round, and reactive to light.   Neck:      Thyroid: No thyromegaly.   Cardiovascular:      Rate and Rhythm: Normal rate and regular rhythm.      Heart sounds: Normal heart sounds. No murmur heard.    No friction rub. No gallop.   Pulmonary:      Effort: Pulmonary effort is normal. No respiratory distress.      Breath sounds: Normal breath sounds. No wheezing or rales.   Chest:      Chest wall: No tenderness.   Abdominal:      General: Bowel sounds are normal.      Palpations: Abdomen is soft.      Tenderness: There is no abdominal tenderness.   Musculoskeletal:         General: No swelling. Normal range of motion.      Cervical back: Normal range of motion and neck supple.   Lymphadenopathy:       Cervical: No cervical adenopathy.   Skin:     General: Skin is warm and dry.      Capillary Refill: Capillary refill takes less than 2 seconds.   Neurological:      Mental Status: He is alert and oriented to person, place, and time.   Psychiatric:         Mood and Affect: Mood normal.         Behavior: Behavior normal.          ARISCAT Preoperative Pulmonary Risk Index.    Age []   <= 50 years old (0 points)    []   51-80 years old (3 points)    [x]   >80 years old (16 points)       Preoperative Oxygen Saturation [x]   >= 96% (0 points)    []   91-95% (8 points)    []   <= 90% (24 points)       Other Clinical Risk Factors []   Respiratory Infection in the last month (17 points)    []   Pre-operative anemia: Hb < 10 g/dL (11 points)    []   Emergency Surgery (8 points)       Surgical Incision []   Upper abdominal (15 points)    []   Intrathoracic (24 points)       Duration of Surgery []   < 2 hours (0 points)    [x]   2-3 hours (16 points)    []   >3 hours (23 points)       Total Criteria Point Count: 32     ARISCAT risk index interpretation:      0-25 points: Low risk  1.6 % pulmonary complication rate.   26-44 points: Intermediate risk 13.3% pulmonary complication rate.    points: High risk 42.1 % pulmonary complication rate.     Postoperative Pulmonary Complications include but are not limited to: Respiratory Failure, Pulmonary Infection, Pleural Effusion, Atelectasis, Pneumothorax Bronchospasm, Aspiration Pneumonia.    No images are attached to the encounter or orders placed in the encounter.    Chest PA/lateral: Official report pending    Full PFTs performed in the office today reviewed by me, FVC 2.95 75% predicted, FEV1 1.89 68% predicted, FEV1/FVC 64% predicted, TLC 5.54 88% predicted, DLCO 69% predicted, mild obstruction, no restriction and reduced DLCO.    A/P    Problem List Items Addressed This Visit        Pulmonary and Pneumonias    Shortness of breath    ILD (interstitial lung disease) (HCC)        Sleep    PARUL (obstructive sleep apnea)   Other Visit Diagnoses     Preoperative clearance    -  Primary    Relevant Orders    XR Chest PA & Lateral    Pulmonary Function Test (Completed)          Mr. Badillo was here for follow-up of his shortness of breath.  He seems to be doing fairly well from a pulmonary standpoint.  He has not had any exacerbations or respiratory illnesses in the last 6 months.  We did review his full PFTs in the office today and he continues to have a mild obstruction.  He is currently not using any inhaler therapy.  He has limited activity due to his right leg and hip pain.    We also reviewed his chest x-ray in the office today and the official report is pending.  I will notify him if there are any abnormalities.    He is considering possible hip surgery in the future if this is a option for him.  He was requesting preoperative clearance for this today.    Based on the Ariscat preoperative pulmonary risk index he is at an intermediate risk for pulmonary complications related to his age and duration of surgery.  We discussed the possible pulmonary complications including respiratory failure, pulmonary infection, pleural effusion, atelectasis, pneumothorax bronchospasm and aspiration pneumonia.  We also discussed good pulmonary toileting such as cough/deep breathing, using incentive parameter before and after surgery and early mobilization.  He might benefit from being hospitalized after his surgery if he does proceed with surgery.  After discussing the possible complications of surgery he is agreeable to proceed if it is recommended.  He is going to talk to his surgeon to discuss further.    He will continue to wear his CPAP every night for PARUL.  I did encourage him to use his oxygen at 3 L bled into his machine.  Also encouraged him to keep his oxygen during the day as needed.  He is currently monitoring his oxygen and will using the oxygen during the day appropriately.    He will  follow-up in 6 months or sooner if his symptoms worsen.  I did advise him to call with any additional questions    Level of service justified based on 32 minutes spent in patient care on this date of service including, but not limited to: preparing to see the patient, obtaining and/or reviewing history, performing medically appropriate examination, ordering tests/medicine/procedures, independently interpreting results, documenting clinical information in EHR, and counseling/education of patient/family/caregiver. (Level 4 30-39 minutes; Level 5 40-54 minutes)      HERMES Patel  01/24/202313:54 EST  Electronically signed     Please note that portions of this note were completed with a voice recognition program.        CC: Schumer, Barry, MD Marlana L Keeton, APRN

## 2023-03-09 ENCOUNTER — TELEPHONE (OUTPATIENT)
Dept: GASTROENTEROLOGY | Facility: CLINIC | Age: 83
End: 2023-03-09
Payer: MEDICARE

## 2023-03-09 NOTE — TELEPHONE ENCOUNTER
Caller: Pablo Badillo    Relationship to patient: Self    Best call back number: [phone]    Patient is needing: PATIENT HAD TO RESCHEDULE HIS ANNUAL APPT DUE TO HAVING A HIP REPLACEMENT. THE EARLIEST APPT WAS IN June. HE WANTED TO MAKE SURE THIS WAS OK WITH THE OFFICE AND INSURANCE. CALL PATIENT IF THIS RESCHEDULE IS A PROBLEM. HE NEEDED TO WAIT UNTIL LATE MAY ANYWAY TO RECOVER FROM THE SURGERY.

## 2023-05-08 ENCOUNTER — TREATMENT (OUTPATIENT)
Dept: PHYSICAL THERAPY | Facility: CLINIC | Age: 83
End: 2023-05-08
Payer: MEDICARE

## 2023-05-08 DIAGNOSIS — M25.551 RIGHT HIP PAIN: Primary | ICD-10-CM

## 2023-05-08 PROCEDURE — 97162 PT EVAL MOD COMPLEX 30 MIN: CPT | Performed by: PHYSICAL THERAPIST

## 2023-05-08 PROCEDURE — 97110 THERAPEUTIC EXERCISES: CPT | Performed by: PHYSICAL THERAPIST

## 2023-05-08 PROCEDURE — 97530 THERAPEUTIC ACTIVITIES: CPT | Performed by: PHYSICAL THERAPIST

## 2023-05-08 NOTE — PROGRESS NOTES
Physical Therapy Initial Evaluation and Plan of Care    [facility]  [address]  [place], [place] [address] [phone]    Patient: Pablo Badillo   : 1940  Diagnosis/ICD-10 Code:  No primary diagnosis found.  Referring practitioner: Donald Orosco*    Subjective Evaluation    History of Present Illness  Date of surgery: 4/10/2023    Subjective comment: Had a previous history of right posterior hip pain, medial knee pain and posterior lower leg pain.  Had no lower leg pain immediately after surgery.  His right knee pain is being managed by [facility] orthopaedics with steroid injections.  Has been using a rollator for the past 2 years.  Diagnosed with peripheral neuropathy around the time he started using a walker.  Denies numbness and tingling in the right leg.  Saw the orthopaedic surgeon for a follow up visit with xrays showing good healing.  (No signs of infection in surgical incision.  Wife checks it daily.  Currrently using 3L of 02 at night time.  Otherwise, uses 1.5.-2 L during the day when being active.  Discharges use of aspirin this week and no longer needs compression stalkings.)  Patient Occupation: Retired-[employer] Quality of life: good    Pain  Alleviating factors: Elevating R leg in bed->eases swelling   Exacerbated by: R sidelying; walking to bathroom in evenings after being asleep.  Progression: improved    Social Support  Lives in: community-based residential facility  Lives with: spouse    Treatments  Previous treatment: physical therapy (Has not needed pain medication since surgery; home health discharged on 2023)  Current treatment: medication  Current treatment comments: Gabapentin for peripheral neuropathy.     Patient Goals  Patient goal: Be able to walk a block with a cane.  Improve balance.         *LEFS:  15/80    Objective          Passive Range of Motion     Right Hip   Flexion: 90 degrees   Internal rotation  (90/90): 10 degrees     Strength/Myotome Testing     Additional Strength Details  *Knee ext:  5/5  *Ankle DF:  5/5          Assessment & Plan     Assessment  Impairments: abnormal or restricted ROM, activity intolerance, impaired balance, impaired physical strength and lacks appropriate home exercise program  Functional Limitations: walking and standing  Assessment details: Mr. Badillo is a very pleasant 82 year old male that presents to physical therapy s/p R CELSO on 4/10/2023.  PMH was covered and reviewed during interview.  Ambulates with a 4 wheel walker safely with good loading tolerance over the right lower extremity.  No signs or symptoms of DVT or infection.  Has good hip mobility, but following precautions, so not all motion was tested today.  Has good strength throughout the right lower leg musculature.  Will focus care on improving hip strength following precautionary measures of motion.  Followed by a graded loading program.    Prognosis: good    Goals  Plan Goals: STGs:  1.)  Have 110 deg of passive hip flexion in 6 weeks.  2.)  Have 4/5 hip strength in all planes in 6 weeks.  LTGs:  1.)  Ambulate with a single point cane in 8 weeks.        Plan  Therapy options: will be seen for skilled therapy services  Planned modality interventions: thermotherapy (hydrocollator packs) and cryotherapy  Planned therapy interventions: therapeutic activities, stretching, strengthening, manual therapy, abdominal trunk stabilization, balance/weight-bearing training, functional ROM exercises, home exercise program and gait training  Frequency: 1x week  Duration in visits: 6  Duration in weeks: 6  Treatment plan discussed with: patient      *Updated HEP with written and verbal instruction (included in total time billed as TE below).  *Extensive education and counseling regarding importance of performing exercises to improve blood flow throughout the right leg, following hip precautions and continued performance of home  exercises prescribed by home health (included in total time billed as TA below).    Manual Therapy:         mins  55996;  Therapeutic Exercise:    15     mins  46538;     Neuromuscular Cheikh:        mins  76063;    Therapeutic Activity:     9     mins  29526;     Gait Training:           mins  57731;     Ultrasound:         mins  69011;    Electrical Stimulation:         mins  82842 ( );  Dry Needling          mins self-pay    Timed Treatment:   24   mins   Total Treatment:     55   mins    PT SIGNATURE: Don Potter, PT   DATE TREATMENT INITIATED: 5/8/2023    Initial Certification  Certification Period: 8/6/2023  I certify that the therapy services are furnished while this patient is under my care.  The services outlined above are required by this patient, and will be reviewed every 90 days.     PHYSICIAN: Donald Taylor PA  NPI: [id number]                                      DATE:    Please sign and return via fax to [phone].. Thank you, [facility].

## 2023-05-18 ENCOUNTER — TRANSCRIBE ORDERS (OUTPATIENT)
Dept: PHYSICAL THERAPY | Facility: CLINIC | Age: 83
End: 2023-05-18
Payer: MEDICARE

## 2023-05-18 ENCOUNTER — TREATMENT (OUTPATIENT)
Dept: PHYSICAL THERAPY | Facility: CLINIC | Age: 83
End: 2023-05-18
Payer: MEDICARE

## 2023-05-18 DIAGNOSIS — M25.551 RIGHT HIP PAIN: Primary | ICD-10-CM

## 2023-05-18 PROCEDURE — 97140 MANUAL THERAPY 1/> REGIONS: CPT | Performed by: PHYSICAL THERAPIST

## 2023-05-18 PROCEDURE — 97110 THERAPEUTIC EXERCISES: CPT | Performed by: PHYSICAL THERAPIST

## 2023-05-18 NOTE — PROGRESS NOTES
Physical Therapy Daily Progress Note    Patient: Pablo Badillo   : 1940  Diagnosis/ICD-10 Code:  Right hip pain [M25.551]  Referring practitioner: Donald Orosco*  Date of Initial Visit: Type: THERAPY  Noted: 2023  Today's Date: 2023  Patient seen for 2 sessions         Pablo Badillo reports feels better since last visit.  Hip feels more natural now.  Sleeping well. Has swelling in right leg that is eased by elevating leg.  No swelling in AM.      Walked 200 ft with a mild incline to garage.  Walked down steps from home.  Losing endurance with walking with use of 02.      Subjective     Objective   See Exercise, Manual, and Modality Logs for complete treatment.       Assessment/Plan  Focused visit on reducing right lateral hip and knee tenderness via manual therapy.  Combined with improving hip and thigh strength.  Has no calf tenderness.  Following hip precautions.  Will f/u next week.           Manual Therapy:   8      mins  25563;  Therapeutic Exercise:   18      mins  94501;     Neuromuscular Cheikh:        mins  08715;    Therapeutic Activity:          mins  66528;     Gait Training:           mins  42109;     Ultrasound:          mins  98091;    Electrical Stimulation:         mins  47579 ( );  Dry Needling          mins self-pay    Timed Treatment: 26     mins   Total Treatment:   30     mins    Don Potter PT  Physical Therapist

## 2023-05-19 ENCOUNTER — TELEMEDICINE (OUTPATIENT)
Dept: SLEEP MEDICINE | Facility: HOSPITAL | Age: 83
End: 2023-05-19
Payer: MEDICARE

## 2023-05-19 VITALS — HEIGHT: 70 IN | BODY MASS INDEX: 32.93 KG/M2 | WEIGHT: 230 LBS

## 2023-05-19 DIAGNOSIS — G47.33 OSA (OBSTRUCTIVE SLEEP APNEA): Primary | ICD-10-CM

## 2023-05-19 NOTE — PROGRESS NOTES
Chief Complaint:   Chief Complaint   Patient presents with   • Follow-up       HPI:    Pablo Badillo is a 82 y.o. male here for follow-up of severe sleep apnea.  Patient was last seen 4/11/2022.  Patient continues to do well with CPAP therapy with oxygen at 3 L bleeding into his CPAP.  He usually sleeps approximately 11 hours nightly and does feel rested upon awakening.  Patient will go to sleep within 15 minutes and will get up 2-3 times in the night.  Patient has an Saint Jacob score of 3/24.  Patient feels he is doing well, has no concerns or complaints and will continue therapy.        Current medications are:   Current Outpatient Medications:   •  atenolol (TENORMIN) 25 MG tablet, Take 12.5 mg by mouth Daily., Disp: , Rfl:   •  atorvastatin (LIPITOR) 10 MG tablet, Take 10 mg by mouth Daily., Disp: , Rfl:   •  escitalopram (LEXAPRO) 20 MG tablet, Take 20 mg by mouth Daily., Disp: , Rfl:   •  finasteride (PROSCAR) 5 MG tablet, Take 5 mg by mouth Daily., Disp: , Rfl:   •  potassium chloride ER (K-TAB) 20 MEQ tablet controlled-release ER tablet, , Disp: , Rfl:   •  tamsulosin (FLOMAX) 0.4 MG capsule 24 hr capsule, Take 1 capsule by mouth Daily., Disp: , Rfl: .      The patient's relevant past medical, surgical, family and social history were reviewed and updated in Epic as appropriate.       Review of Systems   Eyes: Positive for visual disturbance.   Respiratory: Positive for apnea.    Cardiovascular: Positive for leg swelling.   Psychiatric/Behavioral: Positive for sleep disturbance.         Objective:    Physical Exam  Constitutional:       Appearance: Normal appearance.   HENT:      Head: Normocephalic and atraumatic.      Mouth/Throat:      Comments: Class 3 airway    Pulmonary:      Effort: Pulmonary effort is normal. No respiratory distress.   Neurological:      Mental Status: He is alert and oriented to person, place, and time.   Psychiatric:         Mood and Affect: Mood normal.         Behavior: Behavior  normal.         Thought Content: Thought content normal.         Judgment: Judgment normal.         CPAP Report  29/30 days of use  Greater than 4-hour use 97%  Average use 9 hours 54 minutes  95th percentile pressure 13.5  AHI of 2    The patient continues to use and benefit from CPAP therapy.    ASSESSMENT/PLAN    Diagnoses and all orders for this visit:    1. PARUL (obstructive sleep apnea) (Primary)  -     PAP Therapy        1. Counseled patient regarding multimodal approach with healthy nutrition, healthy sleep, regular physical activity, social activities, counseling, and medications. Encouraged to practice lateral sleep position. Avoid alcohol and sedatives close to bedtime.  2.   Refill supplies x1 year.  Return to clinic 1 year sooner symptoms warrant.  Patient gave consent for video visit.    I have reviewed the results of my evaluation and impression and discussed my recommendations in detail with the patient.      Signed by  HERMES Singh    May 19, 2023      CC: Schumer, Barry, MD         No ref. provider found

## 2023-05-24 ENCOUNTER — TELEPHONE (OUTPATIENT)
Dept: PULMONOLOGY | Facility: CLINIC | Age: 83
End: 2023-05-24
Payer: MEDICARE

## 2023-05-25 ENCOUNTER — TREATMENT (OUTPATIENT)
Dept: PHYSICAL THERAPY | Facility: CLINIC | Age: 83
End: 2023-05-25
Payer: MEDICARE

## 2023-05-25 DIAGNOSIS — M25.551 RIGHT HIP PAIN: Primary | ICD-10-CM

## 2023-05-25 PROCEDURE — 97530 THERAPEUTIC ACTIVITIES: CPT | Performed by: PHYSICAL THERAPIST

## 2023-05-25 PROCEDURE — 97140 MANUAL THERAPY 1/> REGIONS: CPT | Performed by: PHYSICAL THERAPIST

## 2023-05-25 PROCEDURE — 97110 THERAPEUTIC EXERCISES: CPT | Performed by: PHYSICAL THERAPIST

## 2023-05-25 NOTE — PROGRESS NOTES
Physical Therapy Daily Progress Note    Patient: Pablo Badillo   : 1940  Diagnosis/ICD-10 Code:  Right hip pain [M25.551]  Referring practitioner: Donald Orosco*  Date of Initial Visit: Type: THERAPY  Noted: 2023  Today's Date: 2023  Patient seen for 3 sessions         Pablo Badillo reports that his hip is feeling less stiff in the past 3 days.  More mobile when lifting leg.  Saw his orthopaedic surgery PA that has eased hip precautions, but remain somewhat cautious.      Subjective     Objective   See Exercise, Manual, and Modality Logs for complete treatment.       Assessment/Plan  Focused visit on improving hip strength and general R LQ strength.  Has notable weakness in active hip flexion, which is expected at this time.  Ambulates with a RW with good weight acceptance.  No pain during exercise.  Very pleased with his progress.  Just need to continue to monitor O2 sat throughout the course of each visit.  Does not use supplementation 02 during the visit, but has it with him.    Will continue to see Mr. Badillo once per week every week.           Manual Therapy:    10     mins  43331;  Therapeutic Exercise:    18     mins  97046;     Neuromuscular Cheikh:        mins  44079;    Therapeutic Activity:     12     mins  99480;     Gait Training:           mins  49637;     Ultrasound:          mins  86832;    Electrical Stimulation:         mins  42511 ( );  Dry Needling          mins self-pay    Timed Treatment:   40   mins   Total Treatment:     44   mins    Don Potter PT  Physical Therapist

## 2023-06-02 ENCOUNTER — TREATMENT (OUTPATIENT)
Dept: PHYSICAL THERAPY | Facility: CLINIC | Age: 83
End: 2023-06-02
Payer: MEDICARE

## 2023-06-02 DIAGNOSIS — M25.551 RIGHT HIP PAIN: Primary | ICD-10-CM

## 2023-06-02 PROCEDURE — 97140 MANUAL THERAPY 1/> REGIONS: CPT | Performed by: PHYSICAL THERAPIST

## 2023-06-02 PROCEDURE — 97110 THERAPEUTIC EXERCISES: CPT | Performed by: PHYSICAL THERAPIST

## 2023-06-02 PROCEDURE — 97530 THERAPEUTIC ACTIVITIES: CPT | Performed by: PHYSICAL THERAPIST

## 2023-06-02 NOTE — PROGRESS NOTES
Physical Therapy Daily Progress Note    Patient: Pablo Badillo   : 1940  Diagnosis/ICD-10 Code:  Right hip pain [M25.551]  Referring practitioner: Donald Orosco*  Date of Initial Visit: Type: THERAPY  Noted: 2023  Today's Date: 2023  Patient seen for 4 sessions         Pablo Badillo reports feeling even better since last visit. Is ready to start working on using a cane to walk.    Subjective     Objective   See Exercise, Manual, and Modality Logs for complete treatment.       Assessment/Plan  Focused visit on improving right hip mobility, more so in flexion. Still being cognizant of hip precautions. Exercises focused on improving hip strength in all planes. Will start gait training with a single point cane at next visit.         Manual Therapy:    12     mins  73935;  Therapeutic Exercise:    18     mins  95906;     Neuromuscular Cheikh:        mins  29681;    Therapeutic Activity:     10     mins  63711;     Gait Training:           mins  24351;     Ultrasound:          mins  66968;    Electrical Stimulation:         mins  40995 ( );  Dry Needling          mins self-pay    Timed Treatment:   40   mins   Total Treatment:     40   mins    Don Potter PT  Physical Therapist

## 2023-06-06 ENCOUNTER — TELEPHONE (OUTPATIENT)
Dept: PULMONOLOGY | Facility: CLINIC | Age: 83
End: 2023-06-06
Payer: MEDICARE

## 2023-06-06 NOTE — TELEPHONE ENCOUNTER
"Patient called today stating recently he's been waking up feeling particularly \"bloated in the stomach/esophagus area\" he wonders if his CPAP settings are set too high. He was told Loraine would handle his sleep apnea issues. -- Please advise.  "

## 2023-06-09 ENCOUNTER — TREATMENT (OUTPATIENT)
Dept: PHYSICAL THERAPY | Facility: CLINIC | Age: 83
End: 2023-06-09
Payer: MEDICARE

## 2023-06-09 DIAGNOSIS — M25.551 RIGHT HIP PAIN: Primary | ICD-10-CM

## 2023-06-09 PROCEDURE — 97140 MANUAL THERAPY 1/> REGIONS: CPT | Performed by: PHYSICAL THERAPIST

## 2023-06-09 PROCEDURE — 97110 THERAPEUTIC EXERCISES: CPT | Performed by: PHYSICAL THERAPIST

## 2023-06-09 NOTE — PROGRESS NOTES
Physical Therapy Daily Progress Note    Patient: Pablo Badillo   : 1940  Diagnosis/ICD-10 Code:  Right hip pain [M25.551]  Referring practitioner: Donald Orosco*  Date of Initial Visit: Type: THERAPY  Noted: 2023  Today's Date: 2023  Patient seen for 5 sessions         Pablo Badillo reports feeling better since starting physical therapy/post-surgery.  Is able to walk around his home with 2 canes.  Is able to walk 500 yards (outside in his neighborhood) with a rollator.  No night pain or stiffness.  Is sitting on a stool while bathing.  Has grab bars.  No longer using elevated toilet seat.  Wants to start transitioning to cane use.      Subjective     Objective   See Exercise, Manual, and Modality Logs for complete treatment.     *LEFS:    *PROM Hip flx:  100 deg  *Hip ext/aBd/flx MMT:  3+/5; 4/5; 4/5    Assessment/Plan  Mr. Badillo has attended physical therapy for a total of 5 visits s/p R CELSO on 4/10/2023.  Has improved right hip mobility in all planes.  Strength is slowly improving.  Is using a rolling walker currently.  Will begin transition to bilateral cane use next visit.  Very pleased with his progress to date.  Will f/u in 2 weeks.           Manual Therapy:    10     mins  73076;  Therapeutic Exercise:    15     mins  09298;     Neuromuscular Cheikh:        mins  77179;    Therapeutic Activity:          mins  65250;     Gait Training:           mins  55052;     Ultrasound:          mins  12181;    Electrical Stimulation:         mins  91433 ( );  Dry Needling          mins self-pay    Timed Treatment:   25   mins   Total Treatment:     25   mins    Don Potter, PT  Physical Therapist

## 2023-07-24 ENCOUNTER — OFFICE VISIT (OUTPATIENT)
Dept: PULMONOLOGY | Facility: CLINIC | Age: 83
End: 2023-07-24
Payer: MEDICARE

## 2023-07-24 VITALS
BODY MASS INDEX: 33.69 KG/M2 | WEIGHT: 235.3 LBS | SYSTOLIC BLOOD PRESSURE: 132 MMHG | HEART RATE: 88 BPM | HEIGHT: 70 IN | TEMPERATURE: 97.2 F | OXYGEN SATURATION: 93 % | DIASTOLIC BLOOD PRESSURE: 78 MMHG

## 2023-07-24 DIAGNOSIS — G47.33 OSA (OBSTRUCTIVE SLEEP APNEA): ICD-10-CM

## 2023-07-24 DIAGNOSIS — R06.02 SHORTNESS OF BREATH: ICD-10-CM

## 2023-07-24 DIAGNOSIS — J84.9 ILD (INTERSTITIAL LUNG DISEASE): Primary | ICD-10-CM

## 2023-07-24 PROCEDURE — 99214 OFFICE O/P EST MOD 30 MIN: CPT | Performed by: NURSE PRACTITIONER

## 2023-07-24 PROCEDURE — 1160F RVW MEDS BY RX/DR IN RCRD: CPT | Performed by: NURSE PRACTITIONER

## 2023-07-24 PROCEDURE — 3078F DIAST BP <80 MM HG: CPT | Performed by: NURSE PRACTITIONER

## 2023-07-24 PROCEDURE — 1159F MED LIST DOCD IN RCRD: CPT | Performed by: NURSE PRACTITIONER

## 2023-07-24 PROCEDURE — 3075F SYST BP GE 130 - 139MM HG: CPT | Performed by: NURSE PRACTITIONER

## 2023-07-24 RX ORDER — ALPRAZOLAM 0.25 MG/1
0.25 TABLET ORAL
COMMUNITY

## 2023-07-25 DIAGNOSIS — J84.9 ILD (INTERSTITIAL LUNG DISEASE): Primary | ICD-10-CM

## 2023-07-25 NOTE — PROGRESS NOTES
[facility] Pulmonary Follow up    CHIEF COMPLAINT    dyspnea    HISTORY OF PRESENT ILLNESS    Pablo Badillo is a 82 y.o.male here today for follow-up.  He was originally referred to our office for a hospital follow-up.  He was hospitalized at [facility] in November 2021 for acute respiratory failure with hypoxia.  He had an abnormal CTA of the chest.  His symptoms improved and he was discharged home on oxygen.  He has been able to wean off of the oxygen during the day majority of the time.    He continues to wear his CPAP every night.  He is getting his supplies regularly.  He does feel like the CPAP is working well for him.  He does continue to use the oxygen bled through the machine at nighttime.  He denies any sleeping difficulties.    He had his right hip replaced since his last appointment and tolerated the procedure well.  He had no complications.  He has noticed his movement has improved and his breathing has gotten better.    He denies any sputum production or hemoptysis.  He denies any chest pain or palpitations.  Denies any lower extremity edema or calf tenderness.    He will occasionally wear his oxygen during the day when his oxygen is below 88%.  He does not wear his oxygen all day long however.  He would like to invest in a portable concentrator to use when outside of the home.    He denies reflux symptoms.  He denies any sinus or allergy symptoms.    He quit smoking about 52 years ago.    He is accompanied today by his wife.    Patient Active Problem List   Diagnosis    Peripheral neuropathy    Essential hypertension    Hyperlipidemia    BPH (benign prostatic hyperplasia)    PARUL (obstructive sleep apnea)    Shortness of breath    ILD (interstitial lung disease)    Rheumatoid arthritis involving multiple sites       No Known Allergies    Current Outpatient Medications:     ALPRAZolam (XANAX) 0.25 MG tablet, Take 1 tablet by mouth., Disp: , Rfl:     atorvastatin (LIPITOR) 10 MG tablet,  Take 1 tablet by mouth Daily., Disp: , Rfl:     escitalopram (LEXAPRO) 20 MG tablet, Take 1 tablet by mouth Daily., Disp: , Rfl:     finasteride (PROSCAR) 5 MG tablet, Take 1 tablet by mouth Daily., Disp: , Rfl:     potassium chloride ER (K-TAB) 20 MEQ tablet controlled-release ER tablet, , Disp: , Rfl:   MEDICATION LIST AND ALLERGIES REVIEWED.    Social History     Tobacco Use    Smoking status: Former     Packs/day: 3.00     Years: 15.00     Pack years: 45.00     Types: Cigarettes     Start date: 1956     Quit date: 1971     Years since quittin.1    Smokeless tobacco: Never   Vaping Use    Vaping Use: Never used   Substance Use Topics    Alcohol use: Yes     Alcohol/week: 7.0 standard drinks     Types: 7 Glasses of wine per week    Drug use: Never       FAMILY AND SOCIAL HISTORY REVIEWED.    Review of Systems   Constitutional:  Negative for activity change, appetite change, fatigue, fever and unexpected weight change.   HENT:  Negative for congestion, postnasal drip, rhinorrhea, sinus pressure, sore throat and voice change.    Eyes:  Negative for visual disturbance.   Respiratory:  Positive for shortness of breath. Negative for cough, chest tightness and wheezing.    Cardiovascular:  Negative for chest pain, palpitations and leg swelling.   Gastrointestinal:  Negative for abdominal distention, abdominal pain, nausea and vomiting.   Endocrine: Negative for cold intolerance and heat intolerance.   Genitourinary:  Negative for difficulty urinating and urgency.   Musculoskeletal:  Negative for arthralgias, back pain and neck pain.   Skin:  Negative for color change and pallor.   Allergic/Immunologic: Negative for environmental allergies and food allergies.   Neurological:  Negative for dizziness, syncope, weakness and light-headedness.   Hematological:  Negative for adenopathy. Does not bruise/bleed easily.   Psychiatric/Behavioral:  Negative for agitation and behavioral problems.  .    /78 (BP  "Location: Right arm, Patient Position: Sitting, Cuff Size: Adult)   Pulse 88   Temp 97.2 °F (36.2 °C) (Infrared)   Ht 177.8 cm (70\")   Wt 107 kg (235 lb 4.8 oz)   SpO2 93% Comment: 1.5 o2  BMI 33.76 kg/m²     Immunization History   Administered Date(s) Administered    COVID-19 (PFIZER) Purple Cap Monovalent 12/29/2020, 01/18/2021, 09/29/2021    Covid-19 (Pfizer) Gray Cap Monovalent 04/12/2022       Physical Exam  Vitals and nursing note reviewed.   Constitutional:       Appearance: He is well-developed. He is not diaphoretic.   HENT:      Head: Normocephalic and atraumatic.   Eyes:      Pupils: Pupils are equal, round, and reactive to light.   Neck:      Thyroid: No thyromegaly.   Cardiovascular:      Rate and Rhythm: Normal rate and regular rhythm.      Heart sounds: Normal heart sounds. No murmur heard.    No friction rub. No gallop.   Pulmonary:      Effort: Pulmonary effort is normal. No respiratory distress.      Breath sounds: Normal breath sounds. No wheezing or rales.   Chest:      Chest wall: No tenderness.   Abdominal:      General: Bowel sounds are normal.      Palpations: Abdomen is soft.      Tenderness: There is no abdominal tenderness.   Musculoskeletal:         General: No swelling. Normal range of motion.      Cervical back: Normal range of motion and neck supple.   Lymphadenopathy:      Cervical: No cervical adenopathy.   Skin:     General: Skin is warm and dry.      Capillary Refill: Capillary refill takes less than 2 seconds.   Neurological:      Mental Status: He is alert and oriented to person, place, and time.   Psychiatric:         Mood and Affect: Mood normal.         Behavior: Behavior normal.         RESULTS      PROBLEM LIST    Problem List Items Addressed This Visit          Pulmonary and Pneumonias    Shortness of breath    ILD (interstitial lung disease) - Primary       Sleep    PARUL (obstructive sleep apnea)         DISCUSSION    Mr. Badillo was here for follow-up of his dyspnea.  " He seems to be doing better from a pulmonary standpoint.  Now that he has had his right hip replaced he is able to ambulate more regularly and feels like his breathing has improved.  I did encourage him to stay physically active.    He is currently not on any inhaler therapy at this time.    We will continue to follow his interstitial lung disease.  We will get a chest x-ray at his next appointment.    He will continue to wear his CPAP at nightly for PARUL.  I did review his download on his phone and I will get a CPAP download from his [employer] company and placed in the chart for review.  He is compliant with his CPAP and will continue to use oxygen bled through the machine at night.    Did encourage him to continue wearing his oxygen during the day to maintain a saturation above 88%.  He is going to look into getting a portable concentrator.  He is can let me know if he wants me to order one.    He will follow-up in 4-6 months.    I personally spent a total of 31 minutes on patient visit today including chart review, face to face with the patient obtaining the history and physical exam, review of pertinent images and tests, counseling and discussion and/or coordination of care as described above, and documentation.  Total time excludes time spent on other separate services such as performing procedures or test interpretation, if applicable.        Loraine Wilson, APRN  07/24/202308:33 EDT  Electronically signed     Please note that portions of this note were completed with a voice recognition program.        CC: Schumer, Barry, MD

## 2023-07-28 DIAGNOSIS — J84.9 ILD (INTERSTITIAL LUNG DISEASE): Primary | ICD-10-CM

## 2023-08-03 ENCOUNTER — TREATMENT (OUTPATIENT)
Dept: PHYSICAL THERAPY | Facility: CLINIC | Age: 83
End: 2023-08-03
Payer: MEDICARE

## 2023-08-03 DIAGNOSIS — M25.551 RIGHT HIP PAIN: Primary | ICD-10-CM

## 2023-08-03 PROCEDURE — 97110 THERAPEUTIC EXERCISES: CPT | Performed by: PHYSICAL THERAPIST

## 2023-08-03 PROCEDURE — 97140 MANUAL THERAPY 1/> REGIONS: CPT | Performed by: PHYSICAL THERAPIST

## 2023-08-16 DIAGNOSIS — J84.9 ILD (INTERSTITIAL LUNG DISEASE): Primary | ICD-10-CM

## 2023-09-18 ENCOUNTER — TREATMENT (OUTPATIENT)
Dept: PHYSICAL THERAPY | Facility: CLINIC | Age: 83
End: 2023-09-18
Payer: MEDICARE

## 2023-09-18 NOTE — PROGRESS NOTES
Physical Therapy Daily Progress Note    Patient: Pablo Badillo   : 1940  Diagnosis/ICD-10 Code:  No primary diagnosis found.  Referring practitioner: Donald Orosco*  Date of Initial Visit: No linked episodes  Today's Date: 2023  Patient seen for Visit count could not be calculated. Make sure you are using a visit which is associated with an episode. sessions         Pablo Badillo reports that his right hip is doing great.  Has been walking 1 block with 2 canes daily.  Combined with using the exercise bike for 10 minutes each day.  Follows up with his surgeon in 2-3 weeks.      Subjective     Objective   See Exercise, Manual, and Modality Logs for complete treatment.     *LEFS:  45/80  *R hip flx AROM:  100 deg  *R hip flx MMT:    *Hip IR/ER (seated):  33 deg/45 deg    Assessment/Plan  Mr. Badillo has attended physical therapy for a total of 10 visits s/p R CELSO on 4/10/2023.  Has excellent hip rotation.  Hip flexion mobility is limited in flexion, but is certainly functional.  Hip flexion strength has improved.  Ambulates very well with two single point canes.  Will allow Mr. Badillo to work on exercises at home to continue to build lower quarter muscle strength and endurance.  Happy to see Mr. Badillo back should any symptoms worsen.    *Updated HEP with written and verbal instruction (included in total time billed as TE below).       Manual Therapy:         mins  37883;  Therapeutic Exercise:    15     mins  24324;     Neuromuscular Cheikh:    9    mins  93243;    Therapeutic Activity:     15     mins  47756;     Gait Training:           mins  72108;     Ultrasound:          mins  97513;    Electrical Stimulation:         mins  35206 ( );  Dry Needling          mins self-pay    Timed Treatment:  39   mins   Total Treatment:     39   mins    Don Potter, PT  Physical Therapist

## 2023-11-29 ENCOUNTER — OFFICE VISIT (OUTPATIENT)
Dept: PULMONOLOGY | Facility: CLINIC | Age: 83
End: 2023-11-29
Payer: MEDICARE

## 2023-11-29 VITALS
WEIGHT: 236.6 LBS | OXYGEN SATURATION: 94 % | HEART RATE: 88 BPM | DIASTOLIC BLOOD PRESSURE: 82 MMHG | HEIGHT: 70 IN | SYSTOLIC BLOOD PRESSURE: 132 MMHG | BODY MASS INDEX: 33.87 KG/M2 | TEMPERATURE: 97.4 F

## 2023-11-29 DIAGNOSIS — R06.02 SHORTNESS OF BREATH: ICD-10-CM

## 2023-11-29 DIAGNOSIS — G47.33 OSA (OBSTRUCTIVE SLEEP APNEA): ICD-10-CM

## 2023-11-29 DIAGNOSIS — J84.9 ILD (INTERSTITIAL LUNG DISEASE): Primary | ICD-10-CM

## 2023-11-30 NOTE — PROGRESS NOTES
[facility], [place], operated by [facility] Follow up    CHIEF COMPLAINT    Dyspnea with exertion    HISTORY OF PRESENT ILLNESS    Pablo Badillo is a 83 y.o.male here today for follow-up.  He was last seen in the office by me in July.  He denies any respiratory illnesses since last appointment.    He was hospitalized at [facility] in 2021 for acute respiratory failure with hypoxia and normal chest CT scan.  He was also discharged on oxygen at that time 2.  He has been able to wean off his oxygen during the day.    He continues to wear his CPAP nightly.  He does use oxygen bled into the machine.  He denies any sleeping difficulties.    He states he is  riding his recumbent bike regularly and does feel like his shortness of breath has improved some.    He denies any chest pain or palpitations.  Denies any lower extremity edema or calf tenderness.    He denies any sputum production or hemoptysis.    He denies reflux symptoms.    He quit smoking 52 years ago.  He is accompanied today by his wife.    Patient Active Problem List   Diagnosis    Peripheral neuropathy    Essential hypertension    Hyperlipidemia    BPH (benign prostatic hyperplasia)    PARUL (obstructive sleep apnea)    Shortness of breath    ILD (interstitial lung disease)    Rheumatoid arthritis involving multiple sites       No Known Allergies    Current Outpatient Medications:     ALPRAZolam (XANAX) 0.25 MG tablet, Take 1 tablet by mouth., Disp: , Rfl:     atorvastatin (LIPITOR) 10 MG tablet, Take 1 tablet by mouth Daily., Disp: , Rfl:     escitalopram (LEXAPRO) 20 MG tablet, Take 1 tablet by mouth Daily., Disp: , Rfl:     finasteride (PROSCAR) 5 MG tablet, Take 1 tablet by mouth Daily., Disp: , Rfl:     potassium chloride ER (K-TAB) 20 MEQ tablet controlled-release ER tablet, , Disp: , Rfl:   MEDICATION LIST AND ALLERGIES REVIEWED.    Social History     Tobacco Use    Smoking status: Former     Packs/day: 3.00     Years: 15.00     Additional pack years: 0.00      "Total pack years: 45.00     Types: Cigarettes     Start date: 1956     Quit date: 1971     Years since quittin.4    Smokeless tobacco: Never   Vaping Use    Vaping Use: Never used   Substance Use Topics    Alcohol use: Yes     Alcohol/week: 7.0 standard drinks of alcohol     Types: 7 Glasses of wine per week    Drug use: Never       FAMILY AND SOCIAL HISTORY REVIEWED.    Review of Systems   Constitutional:  Negative for activity change, appetite change, fatigue, fever and unexpected weight change.   HENT:  Negative for congestion, postnasal drip, rhinorrhea, sinus pressure, sore throat and voice change.    Eyes:  Negative for visual disturbance.   Respiratory:  Positive for shortness of breath. Negative for cough, chest tightness and wheezing.    Cardiovascular:  Negative for chest pain, palpitations and leg swelling.   Gastrointestinal:  Negative for abdominal distention, abdominal pain, nausea and vomiting.   Endocrine: Negative for cold intolerance and heat intolerance.   Genitourinary:  Negative for difficulty urinating and urgency.   Musculoskeletal:  Negative for arthralgias, back pain and neck pain.   Skin:  Negative for color change and pallor.   Allergic/Immunologic: Negative for environmental allergies and food allergies.   Neurological:  Negative for dizziness, syncope, weakness and light-headedness.   Hematological:  Negative for adenopathy. Does not bruise/bleed easily.   Psychiatric/Behavioral:  Negative for agitation and behavioral problems.    .    /82   Pulse 88   Temp 97.4 °F (36.3 °C)   Ht 177.8 cm (70\")   Wt 107 kg (236 lb 9.6 oz)   SpO2 94% Comment: resting, room air  BMI 33.95 kg/m²     Immunization History   Administered Date(s) Administered    COVID-19 (PFIZER) Purple Cap Monovalent 2020, 2021, 2021    COVID-19 F23 (MODERNA) 12YRS+ (SPIKEVAX) 2023    Covid-19 (Pfizer) Gray Cap Monovalent 2022    Influenza, Unspecified 10/04/2023    " Pneumococcal, Unspecified 10/12/2022       Physical Exam  Vitals and nursing note reviewed.   Constitutional:       Appearance: He is well-developed. He is not diaphoretic.   HENT:      Head: Normocephalic and atraumatic.   Eyes:      Pupils: Pupils are equal, round, and reactive to light.   Neck:      Thyroid: No thyromegaly.   Cardiovascular:      Rate and Rhythm: Normal rate and regular rhythm.      Heart sounds: Normal heart sounds. No murmur heard.     No friction rub. No gallop.   Pulmonary:      Effort: Pulmonary effort is normal. No respiratory distress.      Breath sounds: Normal breath sounds. No wheezing or rales.      Comments: Crackles in bases bilaterally  Chest:      Chest wall: No tenderness.   Abdominal:      General: Bowel sounds are normal.      Palpations: Abdomen is soft.      Tenderness: There is no abdominal tenderness.   Musculoskeletal:         General: No swelling. Normal range of motion.      Cervical back: Normal range of motion and neck supple.   Lymphadenopathy:      Cervical: No cervical adenopathy.   Skin:     General: Skin is warm and dry.      Capillary Refill: Capillary refill takes less than 2 seconds.   Neurological:      Mental Status: He is alert and oriented to person, place, and time.   Psychiatric:         Behavior: Behavior normal.           RESULTS    PFTS in the office today, read by me, FVC 3.07 79% predicted, FEV1 1.94 71% predicted, FEV1/FVC 63% predicted, TLC 5.67 90% predicted, DLCO 61% predicted, mild obstruction, no restriction and reduced DLCO.    6-minute walk test: Patient ambulated 500 feet and never desaturated below 90%, he does not meet qualifications for oxygen with activity.    XR Chest PA & Lateral    Result Date: 11/29/2023  Impression: Subtle nonspecific groundglass opacity in the inferior right upper lobe Electronically Signed: Herbie Pantoja  11/29/2023 4:48 PM EST  Workstation ID: [id number]     PROBLEM LIST    Problem List Items Addressed This Visit           Pulmonary and Pneumonias    Shortness of breath    ILD (interstitial lung disease) - Primary    Relevant Orders    XR Chest PA & Lateral (Completed)    Spirometry with Diffusion Capacity & Lung Volumes (Completed)    6 Minute Walk Test (Completed)       Sleep    PARUL (obstructive sleep apnea)         DISCUSSION  Mr. Badillo was here for follow-up.  He seems to be doing fairly well from a pulmonary standpoint.  We did review his PFTs in the office today and he continues to have a mild obstruction with no restriction.  He is currently not using any inhalers.  He feels like his breathing has stabilized.    We reviewed his 6-minute walk test and he does not require oxygen with activity.  He will continue wear his oxygen at night bled through his CPAP.    He will continue to wear his CPAP nightly for PARUL.  He denies any sleeping difficulties with his CPAP.    We reviewed his chest x-ray in the office today that shows a subtle groundglass opacity in the right upper lobe.  We will continue to follow this closely.  He is currently asymptomatic.    We will have him follow-up in May for CPAP compliance check.    I personally spent a total of 32 minutes on patient visit today including chart review, face to face with the patient obtaining the history and physical exam, review of pertinent images and tests, counseling and discussion and/or coordination of care as described above, and documentation.  Total time excludes time spent on other separate services such as performing procedures or test interpretation, if applicable.        Loraine Wilson, APRN  11/29/202311:57 EST  Electronically signed     Please note that portions of this note were completed with a voice recognition program.        CC: Schumer, Barry, MD

## 2024-04-16 ENCOUNTER — OFFICE VISIT (OUTPATIENT)
Dept: PULMONOLOGY | Facility: CLINIC | Age: 84
End: 2024-04-16
Payer: MEDICARE

## 2024-04-16 VITALS
SYSTOLIC BLOOD PRESSURE: 132 MMHG | TEMPERATURE: 98.2 F | WEIGHT: 237 LBS | BODY MASS INDEX: 33.93 KG/M2 | HEART RATE: 81 BPM | DIASTOLIC BLOOD PRESSURE: 72 MMHG | HEIGHT: 70 IN | OXYGEN SATURATION: 93 %

## 2024-04-16 DIAGNOSIS — G47.33 OSA (OBSTRUCTIVE SLEEP APNEA): Primary | ICD-10-CM

## 2024-04-16 PROCEDURE — 1159F MED LIST DOCD IN RCRD: CPT | Performed by: NURSE PRACTITIONER

## 2024-04-16 PROCEDURE — 1160F RVW MEDS BY RX/DR IN RCRD: CPT | Performed by: NURSE PRACTITIONER

## 2024-04-16 PROCEDURE — 3078F DIAST BP <80 MM HG: CPT | Performed by: NURSE PRACTITIONER

## 2024-04-16 PROCEDURE — 3075F SYST BP GE 130 - 139MM HG: CPT | Performed by: NURSE PRACTITIONER

## 2024-04-16 PROCEDURE — 99214 OFFICE O/P EST MOD 30 MIN: CPT | Performed by: NURSE PRACTITIONER

## 2024-04-16 RX ORDER — FUROSEMIDE 20 MG/1
TABLET ORAL
COMMUNITY
Start: 2023-04-04

## 2024-04-16 RX ORDER — TAMSULOSIN HYDROCHLORIDE 0.4 MG/1
CAPSULE ORAL
COMMUNITY
Start: 2020-01-01

## 2024-04-17 NOTE — PROGRESS NOTES
[facility] Follow up    CHIEF COMPLAINT    Abdominal pain from CPAP    HISTORY OF PRESENT ILLNESS    Pablo Badillo is a 83 y.o.male here today for follow-up.  He was last seen in the office by me in July.  He states over the last 4 to 5 months he has had difficulty with his CPAP.  He has noticed more bloating and gas pain recently.  He was concerned and wanted to be seen in the office.    He states he is wearing his CPAP every night.  He did change out his mask but unfortunately he continues to have the difficulty with the gas pain and bloating.  He does not have the settings of his current CPAP.  He does have his download on his phone today.    He averages anywhere from 8 to 10 hours on his CPAP nightly.    He denies any breathing difficulties that are new.  He does continue to be short of breath with exertion.    He denies any sputum production or hemoptysis.  Denies any chest pain or palpitation.  Denies any lower extremity edema or calf tenderness.      He continues to use his oxygen during the day and uses 3 L bled into the machine at night.      He quit smoking 52 years ago.      He is accompanied today by his wife.    Patient Active Problem List   Diagnosis    Peripheral neuropathy    Essential hypertension    Hyperlipidemia    BPH (benign prostatic hyperplasia)    PARUL (obstructive sleep apnea)    Shortness of breath    ILD (interstitial lung disease)    Rheumatoid arthritis involving multiple sites       No Known Allergies    Current Outpatient Medications:     ALPRAZolam (XANAX) 0.25 MG tablet, Take 1 tablet by mouth., Disp: , Rfl:     atorvastatin (LIPITOR) 10 MG tablet, Take 1 tablet by mouth Daily., Disp: , Rfl:     escitalopram (LEXAPRO) 20 MG tablet, Take 1 tablet by mouth Daily., Disp: , Rfl:     finasteride (PROSCAR) 5 MG tablet, Take 1 tablet by mouth Daily., Disp: , Rfl:     furosemide (LASIX) 10 MG half tablet, , Disp: , Rfl:     potassium chloride ER (K-TAB) 20 MEQ tablet  controlled-release ER tablet, , Disp: , Rfl:     tamsulosin (FLOMAX) 0.4 MG capsule 24 hr capsule, , Disp: , Rfl:   MEDICATION LIST AND ALLERGIES REVIEWED.    Social History     Tobacco Use    Smoking status: Former     Current packs/day: 0.00     Average packs/day: 3.0 packs/day for 15.0 years (45.0 ttl pk-yrs)     Types: Cigarettes     Start date: 1956     Quit date: 1971     Years since quittin.8    Smokeless tobacco: Never   Vaping Use    Vaping status: Never Used   Substance Use Topics    Alcohol use: Yes     Alcohol/week: 7.0 standard drinks of alcohol     Types: 7 Glasses of wine per week    Drug use: Never       FAMILY AND SOCIAL HISTORY REVIEWED.    Review of Systems   Constitutional:  Negative for activity change, appetite change, fatigue, fever and unexpected weight change.   HENT:  Negative for congestion, postnasal drip, rhinorrhea, sinus pressure, sore throat and voice change.    Eyes:  Negative for visual disturbance.   Respiratory:  Positive for shortness of breath. Negative for cough, chest tightness and wheezing.    Cardiovascular:  Negative for chest pain, palpitations and leg swelling.   Gastrointestinal:  Positive for abdominal distention. Negative for abdominal pain, nausea and vomiting.        Bloating   Endocrine: Negative for cold intolerance and heat intolerance.   Genitourinary:  Negative for difficulty urinating and urgency.   Musculoskeletal:  Negative for arthralgias, back pain and neck pain.   Skin:  Negative for color change and pallor.   Allergic/Immunologic: Negative for environmental allergies and food allergies.   Neurological:  Negative for dizziness, syncope, weakness and light-headedness.   Hematological:  Negative for adenopathy. Does not bruise/bleed easily.   Psychiatric/Behavioral:  Negative for agitation and behavioral problems.    .    /72 (BP Location: Left arm, Patient Position: Sitting, Cuff Size: Adult)   Pulse 81   Temp 98.2 °F (36.8 °C)   Ht  "177.8 cm (70\")   Wt 108 kg (237 lb)   SpO2 93% Comment: Room air at rest  BMI 34.01 kg/m²     Immunization History   Administered Date(s) Administered    COVID-19 (PFIZER) Purple Cap Monovalent 12/29/2020, 01/18/2021, 09/29/2021    COVID-19 F23 (MODERNA) 12YRS+ (SPIKEVAX) 09/26/2023    Covid-19 (Pfizer) Gray Cap Monovalent 04/12/2022    Influenza, Unspecified 10/04/2023    Pneumococcal, Unspecified 10/12/2022       Physical Exam  Vitals and nursing note reviewed.   Constitutional:       Appearance: He is well-developed. He is not diaphoretic.   HENT:      Head: Normocephalic and atraumatic.   Eyes:      Pupils: Pupils are equal, round, and reactive to light.   Neck:      Thyroid: No thyromegaly.   Cardiovascular:      Rate and Rhythm: Normal rate and regular rhythm.      Heart sounds: Normal heart sounds. No murmur heard.     No friction rub. No gallop.   Pulmonary:      Effort: Pulmonary effort is normal. No respiratory distress.      Breath sounds: Normal breath sounds. No wheezing or rales.   Chest:      Chest wall: No tenderness.   Abdominal:      General: Bowel sounds are normal.      Palpations: Abdomen is soft.      Tenderness: There is no abdominal tenderness.   Musculoskeletal:         General: No swelling. Normal range of motion.      Cervical back: Normal range of motion and neck supple.   Lymphadenopathy:      Cervical: No cervical adenopathy.   Skin:     General: Skin is warm and dry.      Capillary Refill: Capillary refill takes less than 2 seconds.   Neurological:      Mental Status: He is alert and oriented to person, place, and time.   Psychiatric:         Mood and Affect: Mood normal.         Behavior: Behavior normal.           RESULTS    PROBLEM LIST    Problem List Items Addressed This Visit          Sleep    PARUL (obstructive sleep apnea) - Primary    Relevant Orders    PAP Therapy         DISCUSSION    Mr. Badillo was here for follow-up.  He seems to doing fairly well from a pulmonary " standpoint.    He has been having issues with his CPAP for the last 4 to 5 months with increased bloating and abdominal distention.  I was able to review his CPAP settings on his machine and we did adjust them in the office today.  I will send a new order over to reflect these changes.  Hopefully this will help with his abdominal distention and bloating.    I did advise him to contact me after using the CPAP for about a week with these new changes and see if he has noticed an improvement.  The pressures were too high and I think this was what was leading to some of the discomfort.    He will keep his follow-up in July as scheduled.    I personally spent a total of 31 minutes on patient visit today including chart review, face to face with the patient obtaining the history and physical exam, review of pertinent images and tests, counseling and discussion and/or coordination of care as described above, and documentation.  Total time excludes time spent on other separate services such as performing procedures or test interpretation, if applicable.        Loraine Wilson, APRN  04/16/202409:48 EDT  Electronically signed     Please note that portions of this note were completed with a voice recognition program.        CC: Schumer, Barry, MD

## 2024-07-09 ENCOUNTER — OFFICE VISIT (OUTPATIENT)
Age: 84
End: 2024-07-09
Payer: MEDICARE

## 2024-07-09 VITALS
HEART RATE: 80 BPM | OXYGEN SATURATION: 96 % | HEIGHT: 70 IN | SYSTOLIC BLOOD PRESSURE: 130 MMHG | DIASTOLIC BLOOD PRESSURE: 62 MMHG | BODY MASS INDEX: 32.35 KG/M2 | WEIGHT: 226 LBS | TEMPERATURE: 98 F

## 2024-07-09 DIAGNOSIS — J84.9 ILD (INTERSTITIAL LUNG DISEASE): Primary | ICD-10-CM

## 2024-07-09 DIAGNOSIS — G47.33 OSA (OBSTRUCTIVE SLEEP APNEA): ICD-10-CM

## 2024-07-09 DIAGNOSIS — R06.02 SHORTNESS OF BREATH: ICD-10-CM

## 2024-07-09 PROCEDURE — 3078F DIAST BP <80 MM HG: CPT | Performed by: NURSE PRACTITIONER

## 2024-07-09 PROCEDURE — 99214 OFFICE O/P EST MOD 30 MIN: CPT | Performed by: NURSE PRACTITIONER

## 2024-07-09 PROCEDURE — 3075F SYST BP GE 130 - 139MM HG: CPT | Performed by: NURSE PRACTITIONER

## 2024-07-09 PROCEDURE — 1160F RVW MEDS BY RX/DR IN RCRD: CPT | Performed by: NURSE PRACTITIONER

## 2024-07-09 PROCEDURE — 1159F MED LIST DOCD IN RCRD: CPT | Performed by: NURSE PRACTITIONER

## 2024-07-09 RX ORDER — ATENOLOL 25 MG/1
12.5 TABLET ORAL EVERY 24 HOURS
COMMUNITY

## 2024-07-10 NOTE — PROGRESS NOTES
[facility] Pulmonary Follow up    CHIEF COMPLAINT    Dyspnea with exertion    HISTORY OF PRESENT ILLNESS    Pablo Badillo is a 83 y.o.male here today for follow-up.  He was last seen in the office by me In April.  He denies any respiratory illnesses since his last appointment.    At his last appointment he had had some difficulties with his CPAP.  We adjusted his settings and he has noticed a great deal of improvement in his symptoms.  He is no longer feeling the abdominal pain from his new machine.    He continues to wear his CPAP nightly.  He would like to get new supplies from the DME company.  He does wear his CPAP a minimum of 6 hours every night.    He continues to be little short of breath with exertion but does recover very quickly at rest.  He has lost some weight and has noticed improvement in his breathing.    He continues to use his oxygen bled through his machine at night at 3 L.  He is concerned that he may not need the oxygen any longer.    He denies any sputum production or hemoptysis.  He denies any chest pain or palpitations.  Denies any lower extremity edema or calf tenderness.    He quit smoking 52 years ago.    He is accompanied today by his wife.    Patient Active Problem List   Diagnosis    Peripheral neuropathy    Essential hypertension    Hyperlipidemia    BPH (benign prostatic hyperplasia)    PARUL (obstructive sleep apnea)    Shortness of breath    ILD (interstitial lung disease)    Rheumatoid arthritis involving multiple sites       No Known Allergies    Current Outpatient Medications:     ALPRAZolam (XANAX) 0.25 MG tablet, Take 1 tablet by mouth., Disp: , Rfl:     atenolol (TENORMIN) 25 MG tablet, Take 0.5 tablets by mouth Daily., Disp: , Rfl:     atorvastatin (LIPITOR) 10 MG tablet, Take 1 tablet by mouth Daily., Disp: , Rfl:     escitalopram (LEXAPRO) 20 MG tablet, Take 1 tablet by mouth Daily., Disp: , Rfl:     finasteride (PROSCAR) 5 MG tablet, Take 1 tablet by mouth Daily., Disp: ,  Rfl:     furosemide (LASIX) 10 MG half tablet, , Disp: , Rfl:     potassium chloride ER (K-TAB) 20 MEQ tablet controlled-release ER tablet, , Disp: , Rfl:     tamsulosin (FLOMAX) 0.4 MG capsule 24 hr capsule, , Disp: , Rfl:   MEDICATION LIST AND ALLERGIES REVIEWED.    Social History     Tobacco Use    Smoking status: Former     Current packs/day: 0.00     Average packs/day: 3.0 packs/day for 15.0 years (45.0 ttl pk-yrs)     Types: Cigarettes     Start date: 1956     Quit date: 1971     Years since quittin.0    Smokeless tobacco: Never   Vaping Use    Vaping status: Never Used   Substance Use Topics    Alcohol use: Yes     Alcohol/week: 7.0 standard drinks of alcohol     Types: 7 Glasses of wine per week    Drug use: Never       FAMILY AND SOCIAL HISTORY REVIEWED.    Review of Systems   Constitutional:  Positive for activity change and fatigue. Negative for appetite change, fever and unexpected weight change.   HENT:  Negative for congestion, postnasal drip, rhinorrhea, sinus pressure, sore throat and voice change.    Eyes:  Negative for visual disturbance.   Respiratory:  Positive for shortness of breath. Negative for cough, chest tightness and wheezing.    Cardiovascular:  Negative for chest pain, palpitations and leg swelling.   Gastrointestinal:  Negative for abdominal distention, abdominal pain, nausea and vomiting.   Endocrine: Negative for cold intolerance and heat intolerance.   Genitourinary:  Negative for difficulty urinating and urgency.   Musculoskeletal:  Negative for arthralgias, back pain and neck pain.   Skin:  Negative for color change and pallor.   Allergic/Immunologic: Negative for environmental allergies and food allergies.   Neurological:  Negative for dizziness, syncope, weakness and light-headedness.   Hematological:  Negative for adenopathy. Does not bruise/bleed easily.   Psychiatric/Behavioral:  Negative for agitation and behavioral problems.    .    /62   Pulse 80    "Temp 98 °F (36.7 °C)   Ht 177.8 cm (70\")   Wt 103 kg (226 lb)   SpO2 96% Comment: Room air at rest  BMI 32.43 kg/m²     Immunization History   Administered Date(s) Administered    COVID-19 (PFIZER) Purple Cap Monovalent 12/29/2020, 01/18/2021, 09/29/2021    COVID-19 F23 (MODERNA) 12YRS+ (SPIKEVAX) 09/26/2023    Covid-19 (Pfizer) Gray Cap Monovalent 04/12/2022    Influenza, Unspecified 10/04/2023    Pneumococcal, Unspecified 10/12/2022       Physical Exam  Vitals and nursing note reviewed.   Constitutional:       Appearance: He is well-developed. He is not diaphoretic.   HENT:      Head: Normocephalic and atraumatic.   Eyes:      Pupils: Pupils are equal, round, and reactive to light.   Neck:      Thyroid: No thyromegaly.   Cardiovascular:      Rate and Rhythm: Normal rate and regular rhythm.      Heart sounds: Normal heart sounds. No murmur heard.     No friction rub. No gallop.   Pulmonary:      Effort: Pulmonary effort is normal. No respiratory distress.      Breath sounds: Normal breath sounds. No wheezing or rales.   Chest:      Chest wall: No tenderness.   Abdominal:      General: Bowel sounds are normal.      Palpations: Abdomen is soft.      Tenderness: There is no abdominal tenderness.   Musculoskeletal:         General: No swelling. Normal range of motion.      Cervical back: Normal range of motion and neck supple.   Lymphadenopathy:      Cervical: No cervical adenopathy.   Skin:     General: Skin is warm and dry.      Capillary Refill: Capillary refill takes less than 2 seconds.   Neurological:      Mental Status: He is alert and oriented to person, place, and time.   Psychiatric:         Mood and Affect: Mood normal.         Behavior: Behavior normal.           RESULTS    CPAP download: Patient is 100% compliant over the last 30 days.  He is averaging 9 hours and 55 minutes.  His average pressures are 8-10, his average AHI is 2.9    PROBLEM LIST    Problem List Items Addressed This Visit          " Pulmonary and Pneumonias    Shortness of breath    ILD (interstitial lung disease) - Primary       Sleep    PARUL (obstructive sleep apnea)         DISCUSSION    Mr. Badillo was here for follow-up.  He seems to doing okay from a pulmonary standpoint.  He will continue to do daily exercise to help with his dyspnea.  I did encourage him to try to do 15 minutes of exercise daily.  We also discussed continuing to lose weight.    I reviewed his CPAP download he is compliant.  It is benefiting him and we will continue to use this nightly.  I  did encourage him to go by the [employer] and see if they can get supplies for him that he can .    His pressures are currently good and no changes need to be made to the CPAP.  He is currently using 3 L bled into the machine of encourage him to try to go down to 2 L to see if this changes his sleeping.  He may not require oxygen at night.  We also discussed doing an overnight oximetry and we will hold off on this for now.  He is going to let me know if he wants to pursue this in the future.    He will follow-up in 6 months with full PFTs and a 6-minute walk test/CXR.    I personally spent a total of 33 minutes on patient visit today including chart review, face to face with the patient obtaining the history and physical exam, review of pertinent images and tests, counseling and discussion and/or coordination of care as described above, and documentation.  Total time excludes time spent on other separate services such as performing procedures or test interpretation, if applicable.        Loraine Wilson, HERMES  07/09/202408:21 EDT  Electronically signed     Please note that portions of this note were completed with a voice recognition program.        CC: Schumer, Barry, MD

## 2024-10-10 ENCOUNTER — TELEPHONE (OUTPATIENT)
Age: 84
End: 2024-10-10
Payer: MEDICARE

## 2024-10-15 DIAGNOSIS — J84.9 ILD (INTERSTITIAL LUNG DISEASE): Primary | ICD-10-CM

## 2024-10-16 ENCOUNTER — OFFICE VISIT (OUTPATIENT)
Age: 84
End: 2024-10-16
Payer: MEDICARE

## 2024-10-16 ENCOUNTER — HOSPITAL ENCOUNTER (OUTPATIENT)
Facility: HOSPITAL | Age: 84
Discharge: HOME | End: 2024-10-16
Admitting: NURSE PRACTITIONER
Payer: MEDICARE

## 2024-10-16 VITALS
DIASTOLIC BLOOD PRESSURE: 68 MMHG | BODY MASS INDEX: 32.04 KG/M2 | TEMPERATURE: 98.3 F | OXYGEN SATURATION: 96 % | HEART RATE: 76 BPM | SYSTOLIC BLOOD PRESSURE: 120 MMHG | HEIGHT: 70 IN | WEIGHT: 223.8 LBS

## 2024-10-16 DIAGNOSIS — G47.33 OSA (OBSTRUCTIVE SLEEP APNEA): Primary | ICD-10-CM

## 2024-10-16 DIAGNOSIS — J84.9 ILD (INTERSTITIAL LUNG DISEASE): ICD-10-CM

## 2024-10-16 DIAGNOSIS — R06.02 SHORTNESS OF BREATH: ICD-10-CM

## 2024-10-16 PROCEDURE — 71046 X-RAY EXAM CHEST 2 VIEWS: CPT

## 2024-10-16 RX ORDER — FUROSEMIDE 20 MG
TABLET ORAL
COMMUNITY
Start: 2024-07-24

## 2024-10-16 RX ADMIN — ALBUTEROL SULFATE 4 PUFF: 90 INHALANT RESPIRATORY (INHALATION) at 11:58

## 2024-10-17 RX ORDER — ALBUTEROL SULFATE 90 UG/1
4 INHALANT RESPIRATORY (INHALATION) ONCE
Status: COMPLETED | OUTPATIENT
Start: 2024-10-17 | End: 2024-10-16

## 2024-10-17 NOTE — PROGRESS NOTES
[facility] Pulmonary Follow up    CHIEF COMPLAINT    Mild dyspnea with exertion    HISTORY OF PRESENT ILLNESS    Pablo Badillo is a 84 y.o.male here today for follow-up.  He was last seen in the office by me in July.  He has been doing very well from his pulmonary standpoint since his last appointment.  He denies any respiratory illnesses since his last appointment.    He was originally hospitalized in November 2021 for respiratory failure with hypoxia.  At that time he was started on oxygen and CT of the chest was concerning for interstitial edema or infiltrate in the right upper lung.  He is repeat CT scans after this hospitalization did show some evidence of interstitial lung disease.  He does have a history of rheumatoid arthritis but has not been seeing rheumatology.    Since his last appointment in July he has not been using his oxygen during the day.  He does feel like his shortness of breath has improved.  He is trying to exercise more regularly and is trying to use his bicycle 3 times per week.    He is currently not using any inhalers.    He continues to wear his CPAP.  He has not been using his oxygen with CPAP at night.  He denies any sleeping difficulties.  He is  wondering if he even needs the oxygen during the night with his CPAP.    He does feel well rested.  He denies any sleeping difficulties.    He denies any chest pain or palpitations.  Denies any lower extremity edema or calf tenderness.  He does take Lasix on a regular basis.  He is accompanied today by his wife.    He quit smoking 53 years ago    Patient Active Problem List   Diagnosis    Peripheral neuropathy    Essential hypertension    Hyperlipidemia    BPH (benign prostatic hyperplasia)    PARUL (obstructive sleep apnea)    Shortness of breath    ILD (interstitial lung disease)    Rheumatoid arthritis involving multiple sites       No Known Allergies    Current Outpatient Medications:     ALPRAZolam (XANAX) 0.25 MG tablet, Take 1 tablet by  mouth., Disp: , Rfl:     atorvastatin (LIPITOR) 10 MG tablet, Take 1 tablet by mouth Daily., Disp: , Rfl:     escitalopram (LEXAPRO) 20 MG tablet, Take 1 tablet by mouth Daily., Disp: , Rfl:     finasteride (PROSCAR) 5 MG tablet, Take 1 tablet by mouth Daily., Disp: , Rfl:     furosemide (LASIX) 20 MG tablet, , Disp: , Rfl:     potassium chloride ER (K-TAB) 20 MEQ tablet controlled-release ER tablet, , Disp: , Rfl:     tamsulosin (FLOMAX) 0.4 MG capsule 24 hr capsule, , Disp: , Rfl:   MEDICATION LIST AND ALLERGIES REVIEWED.    Social History     Tobacco Use    Smoking status: Former     Current packs/day: 0.00     Average packs/day: 3.0 packs/day for 15.0 years (45.0 ttl pk-yrs)     Types: Cigarettes     Start date: 1956     Quit date: 1971     Years since quittin.3    Smokeless tobacco: Never   Vaping Use    Vaping status: Never Used   Substance Use Topics    Alcohol use: Yes     Alcohol/week: 7.0 standard drinks of alcohol     Types: 7 Glasses of wine per week    Drug use: Never       FAMILY AND SOCIAL HISTORY REVIEWED.    Review of Systems   Constitutional:  Negative for activity change, appetite change, fatigue, fever and unexpected weight change.   HENT:  Negative for congestion, postnasal drip, rhinorrhea, sinus pressure, sore throat and voice change.    Eyes:  Negative for visual disturbance.   Respiratory:  Positive for shortness of breath. Negative for cough, chest tightness and wheezing.    Cardiovascular:  Negative for chest pain, palpitations and leg swelling.   Gastrointestinal:  Negative for abdominal distention, abdominal pain, nausea and vomiting.   Endocrine: Negative for cold intolerance and heat intolerance.   Genitourinary:  Negative for difficulty urinating and urgency.   Musculoskeletal:  Negative for arthralgias, back pain and neck pain.   Skin:  Negative for color change and pallor.   Allergic/Immunologic: Negative for environmental allergies and food allergies.   Neurological:   "Negative for dizziness, syncope, weakness and light-headedness.   Hematological:  Negative for adenopathy. Does not bruise/bleed easily.   Psychiatric/Behavioral:  Negative for agitation and behavioral problems.    .    /68   Pulse 76   Temp 98.3 °F (36.8 °C)   Ht 177.8 cm (70\")   Wt 102 kg (223 lb 12.8 oz)   SpO2 96% Comment: Room air at rest  BMI 32.11 kg/m²     Immunization History   Administered Date(s) Administered    COVID-19 (MODERNA) 12YRS+ (SPIKEVAX) 09/26/2023, 09/10/2024    COVID-19 (PFIZER) Purple Cap Monovalent 12/29/2020, 01/18/2021, 09/29/2021    Covid-19 (Pfizer) Gray Cap Monovalent 04/12/2022    Influenza, Unspecified 10/04/2023, 10/01/2024    Pneumococcal, Unspecified 10/12/2022       Physical Exam  Vitals and nursing note reviewed.   Constitutional:       Appearance: He is well-developed. He is not diaphoretic.   HENT:      Head: Normocephalic and atraumatic.   Eyes:      Pupils: Pupils are equal, round, and reactive to light.   Neck:      Thyroid: No thyromegaly.   Cardiovascular:      Rate and Rhythm: Normal rate and regular rhythm.      Heart sounds: Normal heart sounds. No murmur heard.     No friction rub. No gallop.   Pulmonary:      Effort: Pulmonary effort is normal. No respiratory distress.      Breath sounds: Normal breath sounds. No wheezing or rales.   Chest:      Chest wall: No tenderness.   Abdominal:      General: Bowel sounds are normal.      Palpations: Abdomen is soft.      Tenderness: There is no abdominal tenderness.   Musculoskeletal:         General: No swelling. Normal range of motion.      Cervical back: Normal range of motion and neck supple.   Lymphadenopathy:      Cervical: No cervical adenopathy.   Skin:     General: Skin is warm and dry.      Capillary Refill: Capillary refill takes less than 2 seconds.   Neurological:      Mental Status: He is alert and oriented to person, place, and time.   Psychiatric:         Mood and Affect: Mood normal.         " Behavior: Behavior normal.         RESULTS    Spirometry Interpretation: FVC 3.28 87% predicted, FEV1 1.81 66% predicted, FEV1/FVC 55% predicted, TLC 6.28 89% predicted, DLCO 70% predicted, moderate obstruction with no postbronchodilator response, no restriction and reduced DLCO    6-minute walk test: Patient ambulated 900 feet  and desaturated 88% was placed on 2 L pulsed dose to recover above 95% through the remainder of testing, his percent of predicted was 60% he does continue to need oxygen with activity.    Chest PA/lateral: Official report pending    PROBLEM LIST    Problem List Items Addressed This Visit          Pulmonary and Pneumonias    Shortness of breath    ILD (interstitial lung disease)       Sleep    PARUL (obstructive sleep apnea) - Primary    Relevant Orders    PAP Therapy    Overnight Sleep Oximetry Study         DISCUSSION    Mr. Badillo was here for follow-up.  He seems to be doing okay from a pulmonary standpoint.  We did review his PFTs in the office today and he does continue to have a moderate obstruction.  He is currently not using any inhalers.      He feels like his breathing is actually improved from his previous appointment.  I did encourage him to continue staying active and I do think this is helping.  Weight loss is also helped his breathing as well.    For now he is going to continue his CPAP with no oxygen.  I would like to do an overnight test on 2 L to make sure that he does not need his oxygen or not.  I will then call him with the results.    We reviewed his 6-minute walk test and he still requires oxygen with activity but it has lessened since his previous 6-minute walk test.  I would like him to use his oxygen with activity as needed.    We reviewed his chest x-ray in the office today and official report is pending.  I will call him if there are any abnormalities.    Will have him follow-up in 3 months.    I personally spent a total of 31 minutes on patient visit today  including chart review, face to face with the patient obtaining the history and physical exam, review of pertinent images and tests, counseling and discussion and/or coordination of care as described above, and documentation.  Total time excludes time spent on other separate services such as performing procedures or test interpretation, if applicable.        Loraine Wilson, APRN  10/16/887593:34 EDT  Electronically signed     Please note that portions of this note were completed with a voice recognition program.        CC: Schumer, Barry, MD

## 2024-10-22 DIAGNOSIS — G47.33 OSA (OBSTRUCTIVE SLEEP APNEA): ICD-10-CM

## 2025-01-22 ENCOUNTER — OFFICE VISIT (OUTPATIENT)
Age: 85
End: 2025-01-22
Payer: MEDICARE

## 2025-01-22 VITALS
TEMPERATURE: 97.9 F | SYSTOLIC BLOOD PRESSURE: 110 MMHG | DIASTOLIC BLOOD PRESSURE: 60 MMHG | HEIGHT: 70 IN | HEART RATE: 85 BPM | WEIGHT: 223.7 LBS | BODY MASS INDEX: 32.03 KG/M2 | OXYGEN SATURATION: 99 %

## 2025-01-22 DIAGNOSIS — R06.02 SHORTNESS OF BREATH: ICD-10-CM

## 2025-01-22 DIAGNOSIS — J84.9 ILD (INTERSTITIAL LUNG DISEASE): ICD-10-CM

## 2025-01-22 DIAGNOSIS — G47.33 OSA (OBSTRUCTIVE SLEEP APNEA): ICD-10-CM

## 2025-01-22 DIAGNOSIS — J84.115 RESPIRATORY BRONCHIOLITIS INTERSTITIAL LUNG DISEASE: Primary | ICD-10-CM

## 2025-01-22 PROCEDURE — 1160F RVW MEDS BY RX/DR IN RCRD: CPT | Performed by: NURSE PRACTITIONER

## 2025-01-22 PROCEDURE — 1159F MED LIST DOCD IN RCRD: CPT | Performed by: NURSE PRACTITIONER

## 2025-01-22 PROCEDURE — 3078F DIAST BP <80 MM HG: CPT | Performed by: NURSE PRACTITIONER

## 2025-01-22 PROCEDURE — 99214 OFFICE O/P EST MOD 30 MIN: CPT | Performed by: NURSE PRACTITIONER

## 2025-01-22 PROCEDURE — 3074F SYST BP LT 130 MM HG: CPT | Performed by: NURSE PRACTITIONER

## 2025-01-22 RX ORDER — ATENOLOL 25 MG/1
TABLET ORAL
COMMUNITY
Start: 2024-12-01

## 2025-01-22 NOTE — PROGRESS NOTES
[facility] Pulmonary Follow up    CHIEF COMPLAINT    Dyspnea with heavy exertion    HISTORY OF PRESENT ILLNESS    Pablo Badillo is a 84 y.o.male here today for follow-up.  He was last seen in the office by me in October.  He denies any respiratory illnesses since his last appointment.    He was originally hospitalized in November 2021 for respiratory failure with hypoxia.  At that time he was started on oxygen and CT of the chest was concerning for interstitial edema or infiltrate in the right upper lung.  He is repeat CT scans after this hospitalization did show some evidence of interstitial lung disease.  He does have a history of rheumatoid arthritis but has not been seeing rheumatology.     He has tried inhalers in the past but did not notice a significant improvement in his breathing.    He currently does not produce any sputum.  He denies any fever, chills or night sweats.    He continues to use his oxygen during the day intermittently.  He uses his pulsed dose concentrator while riding his bicycle but he does not feel like it is strong enough.  He is increased to 3 L but still does not feel like it strong enough.  He also feels like his concentrator is not working correctly.    He wears 2 L at nighttime bled through his CPAP.  He denies any sleeping difficulties.  He is on auto CPAP.    He denies any chest pain or palpitations.  Denies any lower extremity edema or calf tenderness.    He quit smoking 53 years ago.    He is accompanied today by his wife.    Patient Active Problem List   Diagnosis    Peripheral neuropathy    Essential hypertension    Hyperlipidemia    BPH (benign prostatic hyperplasia)    PARUL (obstructive sleep apnea)    Shortness of breath    ILD (interstitial lung disease)    Rheumatoid arthritis involving multiple sites       No Known Allergies    Current Outpatient Medications:     atenolol (TENORMIN) 12.5 MG half tablet, , Disp: , Rfl:     atorvastatin (LIPITOR) 10 MG tablet, Take 1 tablet  by mouth Daily., Disp: , Rfl:     escitalopram (LEXAPRO) 20 MG tablet, Take 1 tablet by mouth Daily., Disp: , Rfl:     finasteride (PROSCAR) 5 MG tablet, Take 1 tablet by mouth Daily., Disp: , Rfl:     furosemide (LASIX) 20 MG tablet, , Disp: , Rfl:     potassium chloride ER (K-TAB) 20 MEQ tablet controlled-release ER tablet, , Disp: , Rfl:     tamsulosin (FLOMAX) 0.4 MG capsule 24 hr capsule, , Disp: , Rfl:   MEDICATION LIST AND ALLERGIES REVIEWED.    Social History     Tobacco Use    Smoking status: Former     Current packs/day: 0.00     Average packs/day: 3.0 packs/day for 15.0 years (45.0 ttl pk-yrs)     Types: Cigarettes     Start date: 1956     Quit date: 1971     Years since quittin.6    Smokeless tobacco: Never   Vaping Use    Vaping status: Never Used   Substance Use Topics    Alcohol use: Yes     Alcohol/week: 7.0 standard drinks of alcohol     Types: 7 Glasses of wine per week    Drug use: Never       FAMILY AND SOCIAL HISTORY REVIEWED.    Review of Systems   Constitutional:  Negative for activity change, appetite change, fatigue, fever and unexpected weight change.   HENT:  Negative for congestion, postnasal drip, rhinorrhea, sinus pressure, sore throat and voice change.    Eyes:  Negative for visual disturbance.   Respiratory:  Positive for shortness of breath. Negative for cough, chest tightness and wheezing.    Cardiovascular:  Negative for chest pain, palpitations and leg swelling.   Gastrointestinal:  Negative for abdominal distention, abdominal pain, nausea and vomiting.   Endocrine: Negative for cold intolerance and heat intolerance.   Genitourinary:  Negative for difficulty urinating and urgency.   Musculoskeletal:  Negative for arthralgias, back pain and neck pain.   Skin:  Negative for color change and pallor.   Allergic/Immunologic: Negative for environmental allergies and food allergies.   Neurological:  Negative for dizziness, syncope, weakness and light-headedness.  "  Hematological:  Negative for adenopathy. Does not bruise/bleed easily.   Psychiatric/Behavioral:  Negative for agitation and behavioral problems.    .    /60 (BP Location: Right arm, Patient Position: Sitting, Cuff Size: Adult)   Pulse 85   Temp 97.9 °F (36.6 °C) (Oral)   Ht 177.8 cm (70\")   Wt 101 kg (223 lb 11.2 oz)   SpO2 99% Comment: room air resting  BMI 32.10 kg/m²     Immunization History   Administered Date(s) Administered    COVID-19 (MODERNA) 12YRS+ (SPIKEVAX) 09/26/2023, 09/10/2024    COVID-19 (PFIZER) Purple Cap Monovalent 12/29/2020, 01/18/2021, 09/29/2021    Covid-19 (Pfizer) Gray Cap Monovalent 04/12/2022    Influenza, Unspecified 10/04/2023, 10/01/2024    Pneumococcal, Unspecified 10/12/2022       Physical Exam  Vitals and nursing note reviewed.   Constitutional:       Appearance: He is well-developed. He is not diaphoretic.   HENT:      Head: Normocephalic and atraumatic.   Eyes:      Pupils: Pupils are equal, round, and reactive to light.   Neck:      Thyroid: No thyromegaly.   Cardiovascular:      Rate and Rhythm: Normal rate and regular rhythm.      Heart sounds: Normal heart sounds. No murmur heard.     No friction rub. No gallop.   Pulmonary:      Effort: Pulmonary effort is normal. No respiratory distress.      Breath sounds: Normal breath sounds. No wheezing or rales.   Chest:      Chest wall: No tenderness.   Abdominal:      General: Bowel sounds are normal.      Palpations: Abdomen is soft.      Tenderness: There is no abdominal tenderness.   Musculoskeletal:         General: No swelling. Normal range of motion.      Cervical back: Normal range of motion and neck supple.   Lymphadenopathy:      Cervical: No cervical adenopathy.   Skin:     General: Skin is warm and dry.      Capillary Refill: Capillary refill takes less than 2 seconds.   Neurological:      Mental Status: He is alert and oriented to person, place, and time.   Psychiatric:         Mood and Affect: Mood normal.   "       Behavior: Behavior normal.           RESULTS    Chest PA/lateral: Official report pending    CPAP download: Patient is 100% compliant, average use is 10 hours and 2 minutes.  He is on a auto CPAP, average AHI is 4.1.    PROBLEM LIST    Problem List Items Addressed This Visit          Pulmonary and Pneumonias    Shortness of breath    ILD (interstitial lung disease)       Sleep    PARUL (obstructive sleep apnea)     Other Visit Diagnoses       Respiratory bronchiolitis interstitial lung disease    -  Primary    Relevant Orders    XR Chest PA & Lateral    Oxygen Therapy              DISCUSSION    Mr. Badillo was here for follow-up.  He seems to doing okay from a pulmonary standpoint.  We did review his chest x-ray in the office today and the official report is pending.  I will contact him if there is any abnormalities.    I did encourage him to stay physically active to help with his shortness of breath.  He does continue to use his bicycle 3 times per week.    Go to get an order in the computer to have his portable concentrator serviced to make sure it is working correctly.  If we can get it switched to a continuous flow that would be ideal but if not I will let the [employer] company figure out what he can do.    I reviewed his CPAP download and he is compliant.  He will continue to wear his CPAP nightly.  He does benefit from his CPAP.    We will have him follow-up in 6 months.    I personally spent a total of 33 minutes on patient visit today including chart review, face to face with the patient obtaining the history and physical exam, review of pertinent images and tests, counseling and discussion and/or coordination of care as described above, and documentation.  Total time excludes time spent on other separate services such as performing procedures or test interpretation, if applicable.        Loraine Wilson, HERMES  01/22/202514:22 EST  Electronically signed     Please note that portions of this note were  completed with a voice recognition program.        CC: Schumer, Barry, MD

## 2025-07-22 ENCOUNTER — OFFICE VISIT (OUTPATIENT)
Age: 85
End: 2025-07-22
Payer: MEDICARE

## 2025-07-22 VITALS
DIASTOLIC BLOOD PRESSURE: 62 MMHG | WEIGHT: 227.5 LBS | OXYGEN SATURATION: 92 % | HEIGHT: 70 IN | HEART RATE: 65 BPM | BODY MASS INDEX: 32.57 KG/M2 | TEMPERATURE: 97.9 F | SYSTOLIC BLOOD PRESSURE: 132 MMHG

## 2025-07-22 DIAGNOSIS — J47.9 BRONCHIECTASIS WITHOUT ACUTE EXACERBATION: ICD-10-CM

## 2025-07-22 DIAGNOSIS — R06.02 SHORTNESS OF BREATH: Primary | ICD-10-CM

## 2025-07-22 DIAGNOSIS — G47.33 OSA (OBSTRUCTIVE SLEEP APNEA): ICD-10-CM

## 2025-07-22 RX ORDER — ALPRAZOLAM 0.5 MG
TABLET ORAL
COMMUNITY
Start: 2025-05-06

## 2025-07-23 PROBLEM — J47.9 BRONCHIECTASIS WITHOUT ACUTE EXACERBATION: Status: ACTIVE | Noted: 2025-07-23

## 2025-07-23 NOTE — PROGRESS NOTES
[facility] Pulmonary Follow up    CHIEF COMPLAINT    Dyspnea with exertion    HISTORY OF PRESENT ILLNESS    Pablo Badillo is a 84 y.o.male here today for follow-up.  He was last seen in the office by me in January.  He denies any respiratory illnesses since his last appointment.    He was originally hospitalized in November 2021 for respiratory failure with hypoxia. At that time he was started on oxygen and CT of the chest was concerning for interstitial edema or infiltrate in the right upper lung. He is repeat CT scans after this hospitalization did show some evidence of interstitial lung disease. He does have a history of rheumatoid arthritis but has not been seeing rheumatology.     He has tried inhalers in the past but did not notice a significant improvement in his breathing.     He continues to wear his CPAP nightly with 1 L bled into the machine.  He does feel like he is resting well.  He does feel well rested in the mornings.  He states occasionally his CPAP has not been working as well at nighttime.  He does not feel like it is affecting his daily activities.  He had been having issues in the past and we adjusted his settings and this helped.    He has not used his oxygen during the day in quite a while.  He does try to stay active on his stationary bicycle.  He does have some mild shortness of breath with heavy exertion but does recover very quickly at rest.    He denies any sputum production or hemoptysis.  He denies any fever, chills or night sweats.    He denies any chest pain or palpitations.  Denies any lower extremity edema or calf tenderness.    He denies any reflux symptoms.    He quit smoking 53 years ago.    He is accompanied today by his wife.    Patient Active Problem List   Diagnosis    Peripheral neuropathy    Essential hypertension    Hyperlipidemia    BPH (benign prostatic hyperplasia)    PARUL (obstructive sleep apnea)    Shortness of breath    ILD (interstitial lung disease)    Rheumatoid  arthritis involving multiple sites    Bronchiectasis without acute exacerbation       No Known Allergies    Current Outpatient Medications:     ALPRAZolam (XANAX) 0.5 MG tablet, , Disp: , Rfl:     atorvastatin (LIPITOR) 10 MG tablet, Take 1 tablet by mouth Daily., Disp: , Rfl:     escitalopram (LEXAPRO) 20 MG tablet, Take 1 tablet by mouth Daily., Disp: , Rfl:     finasteride (PROSCAR) 5 MG tablet, Take 1 tablet by mouth Daily., Disp: , Rfl:     furosemide (LASIX) 20 MG tablet, , Disp: , Rfl:     potassium chloride ER (K-TAB) 20 MEQ tablet controlled-release ER tablet, , Disp: , Rfl:     tamsulosin (FLOMAX) 0.4 MG capsule 24 hr capsule, , Disp: , Rfl:   MEDICATION LIST AND ALLERGIES REVIEWED.    Social History     Tobacco Use    Smoking status: Former     Current packs/day: 0.00     Average packs/day: 3.0 packs/day for 15.0 years (45.0 ttl pk-yrs)     Types: Cigarettes     Start date: 1956     Quit date: 1971     Years since quittin.0    Smokeless tobacco: Never   Vaping Use    Vaping status: Never Used   Substance Use Topics    Alcohol use: Yes     Alcohol/week: 7.0 standard drinks of alcohol     Types: 7 Glasses of wine per week    Drug use: Never       FAMILY AND SOCIAL HISTORY REVIEWED.    Review of Systems   Constitutional:  Positive for fatigue. Negative for activity change, appetite change, fever and unexpected weight change.   HENT:  Negative for congestion, postnasal drip, rhinorrhea, sinus pressure, sore throat and voice change.    Eyes:  Negative for visual disturbance.   Respiratory:  Positive for shortness of breath. Negative for cough, chest tightness and wheezing.    Cardiovascular:  Negative for chest pain, palpitations and leg swelling.   Gastrointestinal:  Negative for abdominal distention, abdominal pain, nausea and vomiting.   Endocrine: Negative for cold intolerance and heat intolerance.   Genitourinary:  Negative for difficulty urinating and urgency.   Musculoskeletal:  Negative  "for arthralgias, back pain and neck pain.   Skin:  Negative for color change and pallor.   Allergic/Immunologic: Negative for environmental allergies and food allergies.   Neurological:  Negative for dizziness, syncope, weakness and light-headedness.   Hematological:  Negative for adenopathy. Does not bruise/bleed easily.   Psychiatric/Behavioral:  Negative for agitation and behavioral problems.    .    /62   Pulse 65   Temp 97.9 °F (36.6 °C)   Ht 177.8 cm (70\")   Wt 103 kg (227 lb 8 oz)   SpO2 92% Comment: Room air at rest  BMI 32.64 kg/m²     Immunization History   Administered Date(s) Administered    COVID-19 (MODERNA) 12YRS+ (SPIKEVAX) 09/26/2023, 09/10/2024    COVID-19 (PFIZER) Purple Cap Monovalent 12/29/2020, 01/18/2021, 09/29/2021    Covid-19 (Pfizer) Gray Cap Monovalent 04/12/2022    Influenza, Unspecified 10/04/2023, 10/01/2024    Pneumococcal, Unspecified 10/12/2022       Physical Exam  Vitals and nursing note reviewed.   Constitutional:       Appearance: He is well-developed. He is not diaphoretic.   HENT:      Head: Normocephalic and atraumatic.   Eyes:      Pupils: Pupils are equal, round, and reactive to light.   Neck:      Thyroid: No thyromegaly.   Cardiovascular:      Rate and Rhythm: Normal rate and regular rhythm.      Heart sounds: Normal heart sounds. No murmur heard.     No friction rub. No gallop.   Pulmonary:      Effort: Pulmonary effort is normal. No respiratory distress.      Breath sounds: Normal breath sounds. No wheezing or rales.   Chest:      Chest wall: No tenderness.   Abdominal:      General: Bowel sounds are normal.      Palpations: Abdomen is soft.      Tenderness: There is no abdominal tenderness.   Musculoskeletal:         General: No swelling. Normal range of motion.      Cervical back: Normal range of motion and neck supple.   Lymphadenopathy:      Cervical: No cervical adenopathy.   Skin:     General: Skin is warm and dry.      Capillary Refill: Capillary refill " takes less than 2 seconds.   Neurological:      Mental Status: He is alert and oriented to person, place, and time.   Psychiatric:         Mood and Affect: Mood normal.         Behavior: Behavior normal.           RESULTS    Spirometry Interpretation: FVC 3.14 84% predicted, FEV1 1.65 60% predicted, FEV1/FVC 53% predicted, TLC 6.10 86% predicted, DLCO 71% predicted, moderate obstruction, no restriction and reduced DLCO.    CPAP download: Patient is 100% compliant, average use is 9 hours a 54 minutes, he is on an auto CPAP, average AHI is 4    PROBLEM LIST    Problem List Items Addressed This Visit          Pulmonary and Pneumonias    Shortness of breath - Primary    Relevant Orders    Spirometry with Diffusion Capacity & Lung Volumes (Completed)    Bronchiectasis without acute exacerbation       Sleep    PARUL (obstructive sleep apnea)         DISCUSSION    Mr. Badillo was here for follow-up.  He seems to be doing okay from a pulmonary standpoint.  We did review his PFTs in the office today and he continues to have a moderate obstruction.  He has tried inhalers in the past but has not noticed a significant improvement with using them.    I did encourage him to try to stay active and get least 15 to 20 minutes of exercise daily.    He will continue to wear his CPAP nightly.  I did review his download in the office today.  He does benefit from his CPAP and will continue to use this nightly.    We will have him follow-up in 6 months.    I personally spent a total of 31 minutes on patient visit today including chart review, face to face with the patient obtaining the history and physical exam, review of pertinent images and tests, counseling and discussion and/or coordination of care as described above, and documentation.  Total time excludes time spent on other separate services such as performing procedures or test interpretation, if applicable.        Loraine Wilson, HERMES  07/22/202511:42 EDT  Electronically signed      Please note that portions of this note were completed with a voice recognition program.        CC: Schumer, Barry, MD